# Patient Record
Sex: FEMALE | Race: WHITE
[De-identification: names, ages, dates, MRNs, and addresses within clinical notes are randomized per-mention and may not be internally consistent; named-entity substitution may affect disease eponyms.]

---

## 2018-05-17 ENCOUNTER — HOSPITAL ENCOUNTER (INPATIENT)
Dept: HOSPITAL 17 - NEPC | Age: 83
LOS: 9 days | Discharge: NURSING, REHAB OR LONG-TERM CARE | DRG: 225 | End: 2018-05-26
Attending: HOSPITALIST | Admitting: HOSPITALIST
Payer: COMMERCIAL

## 2018-05-17 VITALS
SYSTOLIC BLOOD PRESSURE: 101 MMHG | DIASTOLIC BLOOD PRESSURE: 79 MMHG | RESPIRATION RATE: 18 BRPM | HEART RATE: 106 BPM | OXYGEN SATURATION: 100 %

## 2018-05-17 VITALS
SYSTOLIC BLOOD PRESSURE: 108 MMHG | OXYGEN SATURATION: 100 % | DIASTOLIC BLOOD PRESSURE: 72 MMHG | HEART RATE: 160 BPM | RESPIRATION RATE: 24 BRPM

## 2018-05-17 VITALS
HEART RATE: 93 BPM | TEMPERATURE: 98 F | DIASTOLIC BLOOD PRESSURE: 87 MMHG | RESPIRATION RATE: 20 BRPM | SYSTOLIC BLOOD PRESSURE: 127 MMHG | OXYGEN SATURATION: 98 %

## 2018-05-17 VITALS
RESPIRATION RATE: 17 BRPM | DIASTOLIC BLOOD PRESSURE: 67 MMHG | HEART RATE: 68 BPM | SYSTOLIC BLOOD PRESSURE: 110 MMHG | OXYGEN SATURATION: 100 %

## 2018-05-17 VITALS — BODY MASS INDEX: 25.43 KG/M2 | WEIGHT: 162.04 LBS | HEIGHT: 67 IN

## 2018-05-17 VITALS
DIASTOLIC BLOOD PRESSURE: 77 MMHG | HEART RATE: 92 BPM | SYSTOLIC BLOOD PRESSURE: 131 MMHG | OXYGEN SATURATION: 98 % | RESPIRATION RATE: 20 BRPM

## 2018-05-17 VITALS
HEART RATE: 160 BPM | TEMPERATURE: 98.4 F | RESPIRATION RATE: 17 BRPM | DIASTOLIC BLOOD PRESSURE: 69 MMHG | OXYGEN SATURATION: 100 % | SYSTOLIC BLOOD PRESSURE: 110 MMHG

## 2018-05-17 VITALS
RESPIRATION RATE: 25 BRPM | HEART RATE: 63 BPM | OXYGEN SATURATION: 94 % | DIASTOLIC BLOOD PRESSURE: 69 MMHG | SYSTOLIC BLOOD PRESSURE: 122 MMHG | TEMPERATURE: 97.9 F

## 2018-05-17 VITALS
DIASTOLIC BLOOD PRESSURE: 60 MMHG | HEART RATE: 154 BPM | RESPIRATION RATE: 18 BRPM | SYSTOLIC BLOOD PRESSURE: 119 MMHG | OXYGEN SATURATION: 97 %

## 2018-05-17 VITALS — HEART RATE: 63 BPM

## 2018-05-17 DIAGNOSIS — I34.0: ICD-10-CM

## 2018-05-17 DIAGNOSIS — I10: ICD-10-CM

## 2018-05-17 DIAGNOSIS — E78.5: ICD-10-CM

## 2018-05-17 DIAGNOSIS — I25.2: ICD-10-CM

## 2018-05-17 DIAGNOSIS — R74.8: ICD-10-CM

## 2018-05-17 DIAGNOSIS — I25.5: ICD-10-CM

## 2018-05-17 DIAGNOSIS — Z87.891: ICD-10-CM

## 2018-05-17 DIAGNOSIS — M32.9: ICD-10-CM

## 2018-05-17 DIAGNOSIS — Z95.1: ICD-10-CM

## 2018-05-17 DIAGNOSIS — F41.9: ICD-10-CM

## 2018-05-17 DIAGNOSIS — I47.2: Primary | ICD-10-CM

## 2018-05-17 DIAGNOSIS — R44.3: ICD-10-CM

## 2018-05-17 DIAGNOSIS — I45.2: ICD-10-CM

## 2018-05-17 DIAGNOSIS — Z79.82: ICD-10-CM

## 2018-05-17 DIAGNOSIS — J95.811: ICD-10-CM

## 2018-05-17 DIAGNOSIS — E87.6: ICD-10-CM

## 2018-05-17 DIAGNOSIS — I25.10: ICD-10-CM

## 2018-05-17 LAB
ALBUMIN SERPL-MCNC: 3.7 GM/DL (ref 3.4–5)
ALP SERPL-CCNC: 67 U/L (ref 45–117)
ALT SERPL-CCNC: 25 U/L (ref 10–53)
AST SERPL-CCNC: 35 U/L (ref 15–37)
BASOPHILS # BLD AUTO: 0 TH/MM3 (ref 0–0.2)
BASOPHILS NFR BLD: 0.3 % (ref 0–2)
BILIRUB SERPL-MCNC: 0.9 MG/DL (ref 0.2–1)
BUN SERPL-MCNC: 12 MG/DL (ref 7–18)
CALCIUM SERPL-MCNC: 8.6 MG/DL (ref 8.5–10.1)
CHLORIDE SERPL-SCNC: 107 MEQ/L (ref 98–107)
CREAT SERPL-MCNC: 0.97 MG/DL (ref 0.5–1)
EOSINOPHIL # BLD: 0.1 TH/MM3 (ref 0–0.4)
EOSINOPHIL NFR BLD: 1.3 % (ref 0–4)
ERYTHROCYTE [DISTWIDTH] IN BLOOD BY AUTOMATED COUNT: 13.6 % (ref 11.6–17.2)
GFR SERPLBLD BASED ON 1.73 SQ M-ARVRAT: 55 ML/MIN (ref 89–?)
GLUCOSE SERPL-MCNC: 112 MG/DL (ref 74–106)
HCO3 BLD-SCNC: 23.1 MEQ/L (ref 21–32)
HCT VFR BLD CALC: 39.3 % (ref 35–46)
HGB BLD-MCNC: 13.1 GM/DL (ref 11.6–15.3)
INR PPP: 1 RATIO
LYMPHOCYTES # BLD AUTO: 1 TH/MM3 (ref 1–4.8)
LYMPHOCYTES NFR BLD AUTO: 9.7 % (ref 9–44)
MCH RBC QN AUTO: 32.6 PG (ref 27–34)
MCHC RBC AUTO-ENTMCNC: 33.3 % (ref 32–36)
MCV RBC AUTO: 98 FL (ref 80–100)
MONOCYTE #: 0.6 TH/MM3 (ref 0–0.9)
MONOCYTES NFR BLD: 5.8 % (ref 0–8)
NEUTROPHILS # BLD AUTO: 8.4 TH/MM3 (ref 1.8–7.7)
NEUTROPHILS NFR BLD AUTO: 82.9 % (ref 16–70)
PLATELET # BLD: 147 TH/MM3 (ref 150–450)
PMV BLD AUTO: 10 FL (ref 7–11)
PROT SERPL-MCNC: 6.4 GM/DL (ref 6.4–8.2)
PROTHROMBIN TIME: 10.2 SEC (ref 9.8–11.6)
RBC # BLD AUTO: 4.01 MIL/MM3 (ref 4–5.3)
SODIUM SERPL-SCNC: 143 MEQ/L (ref 136–145)
TROPONIN I SERPL-MCNC: 1.84 NG/ML (ref 0.02–0.05)
WBC # BLD AUTO: 10.2 TH/MM3 (ref 4–11)

## 2018-05-17 PROCEDURE — 85027 COMPLETE CBC AUTOMATED: CPT

## 2018-05-17 PROCEDURE — 99153 MOD SED SAME PHYS/QHP EA: CPT

## 2018-05-17 PROCEDURE — 94664 DEMO&/EVAL PT USE INHALER: CPT

## 2018-05-17 PROCEDURE — 85610 PROTHROMBIN TIME: CPT

## 2018-05-17 PROCEDURE — 71045 X-RAY EXAM CHEST 1 VIEW: CPT

## 2018-05-17 PROCEDURE — 96375 TX/PRO/DX INJ NEW DRUG ADDON: CPT

## 2018-05-17 PROCEDURE — C1777 LEAD, AICD, ENDO SINGLE COIL: HCPCS

## 2018-05-17 PROCEDURE — 83735 ASSAY OF MAGNESIUM: CPT

## 2018-05-17 PROCEDURE — 99152 MOD SED SAME PHYS/QHP 5/>YRS: CPT

## 2018-05-17 PROCEDURE — 94640 AIRWAY INHALATION TREATMENT: CPT

## 2018-05-17 PROCEDURE — 82948 REAGENT STRIP/BLOOD GLUCOSE: CPT

## 2018-05-17 PROCEDURE — 93005 ELECTROCARDIOGRAM TRACING: CPT

## 2018-05-17 PROCEDURE — 85730 THROMBOPLASTIN TIME PARTIAL: CPT

## 2018-05-17 PROCEDURE — 93459 L HRT ART/GRFT ANGIO: CPT

## 2018-05-17 PROCEDURE — C1893 INTRO/SHEATH, FIXED,NON-PEEL: HCPCS

## 2018-05-17 PROCEDURE — 84100 ASSAY OF PHOSPHORUS: CPT

## 2018-05-17 PROCEDURE — 84484 ASSAY OF TROPONIN QUANT: CPT

## 2018-05-17 PROCEDURE — 83880 ASSAY OF NATRIURETIC PEPTIDE: CPT

## 2018-05-17 PROCEDURE — C1769 GUIDE WIRE: HCPCS

## 2018-05-17 PROCEDURE — 85025 COMPLETE CBC W/AUTO DIFF WBC: CPT

## 2018-05-17 PROCEDURE — 96365 THER/PROPH/DIAG IV INF INIT: CPT

## 2018-05-17 PROCEDURE — C1722 AICD, SINGLE CHAMBER: HCPCS

## 2018-05-17 PROCEDURE — 93306 TTE W/DOPPLER COMPLETE: CPT

## 2018-05-17 PROCEDURE — 76937 US GUIDE VASCULAR ACCESS: CPT

## 2018-05-17 PROCEDURE — 87641 MR-STAPH DNA AMP PROBE: CPT

## 2018-05-17 PROCEDURE — 33249 INSJ/RPLCMT DEFIB W/LEAD(S): CPT

## 2018-05-17 PROCEDURE — 80053 COMPREHEN METABOLIC PANEL: CPT

## 2018-05-17 PROCEDURE — 80048 BASIC METABOLIC PNL TOTAL CA: CPT

## 2018-05-17 RX ADMIN — AMIODARONE HYDROCHLORIDE SCH MLS/HR: 50 INJECTION, SOLUTION INTRAVENOUS at 14:27

## 2018-05-17 RX ADMIN — HUMAN INSULIN SCH: 100 INJECTION, SOLUTION SUBCUTANEOUS at 16:00

## 2018-05-17 RX ADMIN — IPRATROPIUM BROMIDE AND ALBUTEROL SULFATE SCH AMPULE: .5; 3 SOLUTION RESPIRATORY (INHALATION) at 16:44

## 2018-05-17 RX ADMIN — FAMOTIDINE SCH MG: 20 TABLET, FILM COATED ORAL at 23:05

## 2018-05-17 RX ADMIN — TEMAZEPAM PRN MG: 7.5 CAPSULE ORAL at 23:05

## 2018-05-17 RX ADMIN — HUMAN INSULIN SCH: 100 INJECTION, SOLUTION SUBCUTANEOUS at 22:00

## 2018-05-17 RX ADMIN — IPRATROPIUM BROMIDE AND ALBUTEROL SULFATE SCH AMPULE: .5; 3 SOLUTION RESPIRATORY (INHALATION) at 21:59

## 2018-05-17 RX ADMIN — PHENYTOIN SODIUM SCH MLS/HR: 50 INJECTION INTRAMUSCULAR; INTRAVENOUS at 16:14

## 2018-05-17 NOTE — MH
cc:

Edu Hart MD, Alaa MD

****

 

 

DATE OF ADMISSION:

05/17/2018

 

HISTORY OF PRESENT ILLNESS:

The patient is an 84-year-old female with past medical history of 

coronary artery disease, previous MI in 1986, status post CABG in 

2001, hypertension, hyperlipidemia and lupus who presented to [facility] ED from Dr. Stokes's office after she was found to 

be in a wide complex tachycardia during an echocardiogram.  The 

patient denies any palpitations, lightheadedness, shortness of breath,

chest pain, syncope; however, she reports chronic edema of the left 

lower extremity.  EKG showed wide complex tachycardia and the patient 

was given adenosine 6 mg and 12 mg bolus without any effect on her 

heart rate.  She was subsequently given amiodarone bolus which was 

repeated and placed on amiodarone drip.  The patient was seen by Dr. Dickerson from Cardiology service.  Her laboratory data showed elevated 

troponin at 1.84.  A chest x-ray in the ED showed compensated 

cardiomegaly, atelectatic changes left hemidiaphragm and laterally in 

the left perihilar distribution.  The patient denies any fever, chills

or any constitutional symptoms.  She denies any similar presentation 

in the past.

 

PAST MEDICAL HISTORY:

Significant for coronary artery disease, previous MI in 1986, 

diverticulitis 2012, hypertension, hyperlipidemia, lupus.

 

PAST SURGICAL HISTORY:

Previous CABG.

 

ALLERGIES:

NO KNOWN DRUG ALLERGIES.

 

SOCIAL HISTORY:

Ex-smoker, quit smoking over 30 years ago.

 

MEDICATIONS AT HOME:

1.  Aspirin.

2.  Metoprolol.

3.  Lovastatin.

 

FAMILY HISTORY:

Noncontributing to present illness.

 

REVIEW OF SYSTEMS:

As per HPI.  The rest of review of systems is unremarkable.

 

PHYSICAL EXAMINATION:

GENERAL:  An 84-year-old female lying in bed in no acute respiratory 

distress.

VITAL SIGNS:  Afebrile with temperature 98.4, pulse of 97, heart rate 

was 150s to 160s on arrival.  Blood pressure 110/67, saturation 

% on room air.

HEENT:  Atraumatic, normocephalic.  Pupils are equal, round, reactive 

to light and accommodation.  Extraocular muscles intact.  Conjunctivae

pink. Nonicteric sclerae.  Oral mucosa within normal.

NECK:  Supple.  No JVD, adenopathy or thyromegaly.  Trachea in the 

midline.

CARDIOVASCULAR:  Regular rate and rhythm.  Normal S1, S2, no murmurs, 

rubs or gallops noted.

PULMONARY:  Bilateral equal entry.  No rales or wheezing.

ABDOMEN:  Soft, nontender.  No distention.  Positive bowel sounds.

EXTREMITIES:  No cyanosis, clubbing, +1 pretibial edema on the left.

NEUROLOGIC:  No focal sensory deficit.

 

CARDIOLOGY STUDIES:

EKG - wide complex tachycardia with nonspecific ST abnormalities.

 

LABORATORY DATA:

Sodium 143, potassium 4.1, chloride 107, CO2 of 23, BUN 12, creatinine

0.97, glucose 112, troponin 1.84, WBC 10.2, hemoglobin 13, hematocrit 

39, platelet count 147.

 

IMAGING STUDIES:

Chest x-ray showed cardiomegaly, atelectasis left hemidiaphragm.

 

IMPRESSION:

1.  Asymptomatic wide complex tachycardia.

2.  Elevated troponin which could be related to tachyarrhythmia; 

however, cannot exclude acute coronary syndrome.

3.  History of coronary artery disease and coronary artery bypass 

graft.

4.  Hypertension.

5.  Hyperlipidemia.

6.  History of lupus.

 

RECOMMENDATIONS:

1.  Monitor neuro status and avoid any sedatives.

2.  Oxygen p.r.n. to maintain sats above 92%.

3.  Bronchodilators on a p.r.n. basis.

4.  Monitor heart rate and blood pressure closely and maintain MAP 

greater than 65 mmHg.

5.  Continue aspirin 325 mg daily and continue with amiodarone drip.

6.  Monitor troponins and the patient will be started on a heparin 

drip per cardiology's recommendations for possible cardiac 

catheterization tomorrow per Dr. Dickerson to reassess patency of her 

bypass grafts and her left ventricular function.

7.  Monitor renal function, I's and O's and electrolyte replacement 

per protocol.

8.  Place on Pepcid for gastrointestinal prophylaxis.

9.  Monitor for signs of infection which include fever and WBC. 

Panculture if spikes a fever.

10.  Place on IV fluids normal saline at 75 mL an hour.

11.  Monitor CBC and coags as patient will be started on a heparin 

drip.

12.  Gastrointestinal prophylaxis with Pepcid and deep vein thrombosis

with sequential compression stockings and heparin drip.

 

Further recommendations will be based on hospital course.

 

 

__________________________________

MD ALEXANDRU Brooks/

D: 05/17/2018, 05:20 PM

T: 05/17/2018, 06:18 PM

Visit #: [id number]

Job #: [id number]

## 2018-05-17 NOTE — RADRPT
EXAM DATE/TIME:  05/17/2018 15:53 

 

[facility] COMPARISON:     

No previous studies available for comparison.

 

                     

INDICATIONS :     

Short of breath

                     

 

MEDICAL HISTORY :     

Myocardial infarction.  Cardiovascular disease.  Lupus.   Aneurysm   

 

SURGICAL HISTORY :     

CABG.   

 

ENCOUNTER:     

Initial                                        

 

ACUITY:     

1 day      

 

PAIN SCORE:     

0/10

 

LOCATION:     

Bilateral chest 

 

FINDINGS:     

A single view of the chest demonstrates a prominent heart without overt evidence of failure. There is
 some atelectatic changes above the left hemidiaphragm and laterally in the left perihilar distributi
on. Median sternotomy wires are intact. Dextroscoliosis of the dorsal spine with associated degenerat
tina spurring.

 

CONCLUSION:     

1. Compensated cardiomegaly.

2. Atelectatic changes above the left hemidiaphragm and laterally in the left perihilar distribution.


 

 

 

 Rashaun Esteban MD on May 17, 2018 at 16:06           

Board Certified Radiologist.

 This report was verified electronically.

## 2018-05-17 NOTE — PD
HPI


Chief Complaint:  Cardiac Complaint


Time Seen by Provider:  13:06


Travel History


International Travel<30 days:  No


Contact w/Intl Traveler<30days:  No


Traveled to known affect area:  No





History of Present Illness


HPI


This is an 84-year-old female who presents to the emergency department with a 

rapid heart rate.  She was getting a routine echo in her cardiology office when 

she was noted to have a heart rate of 150.  An EKG was obtained demonstrating a 

wide complex tachycardia, constant, severe with no associated symptoms.  

Patient denies any shortness of breath, chest pain or palpitations and she says 

she feels normal.  To her knowledge this is never happened before.  She does 

have a history of coronary artery disease and had a CABG years ago.





[facility]


Social History


Alcohol Use:  No


Tobacco Use:  No


Substance Use:  No





Allergies-Medications


(Allergen,Severity, Reaction):  


Coded Allergies:  


     No Known Drug Allergies (Verified  Allergy, Unknown, 5/17/18)





Review of Systems


Except as stated in HPI:  all other systems reviewed are Neg





Physical Exam


Narrative


GENERAL:Well appearing, no acute distress


SKIN: Focused skin assessment warm and dry.


HEAD: Atraumatic. Normocephalic. 


EYES: Pupils equal and round.  No injection or drainage. 


ENT:  Moist mucous membranes


NECK: Trachea midline. 


CARDIOVASCULAR: Tachycardic.  No murmur appreciated.


RESPIRATORY: Clear to auscultation. Breath sounds equal bilaterally. 


GASTROINTESTINAL: Abdomen soft, non-tender, nondistended. 


MUSCULOSKELETAL: No obvious deformities. 


NEUROLOGICAL: Awake and alert. No obvious cranial nerve deficits.  Moving all 

extremities.


PSYCHIATRIC: Appropriate mood and affect; insight and judgment normal.





Data


Data


Last Documented VS





Vital Signs








  Date Time  Temp Pulse Resp B/P (MAP) Pulse Ox O2 Delivery O2 Flow Rate FiO2


 


5/17/18 15:04  154 18 119/60 (79) 97 Room Air  


 


5/17/18 12:55 98.4       








Orders





 Orders


Adenosine Inj (Adenocard Inj) (5/17/18 13:00)


Adenosine Inj (Adenocard Inj) (5/17/18 13:16)


Adenosine Inj (Adenocard Inj) (5/17/18 13:19)


Complete Blood Count With Diff (5/17/18 13:12)


Comprehensive Metabolic Panel (5/17/18 13:12)


Troponin I (5/17/18 13:12)


^ Insert Iv (5/17/18 13:12)


Adenosine Inj (Adenocard Inj) (5/17/18 13:15)


Ecg Monitoring (5/17/18 13:29)


Blood Pressure (5/17/18 13:29)


Vital Signs (5/17/18 13:29)


Iv Access Insert/Monitor (5/17/18 13:29)


Oximetry (5/17/18 13:29)


Dextrose 5% In [place]... W/Amiodarone Inj (5/17/18 13:29)


Dextrose 5% In [place]... W/Amiodarone Inj (5/17/18 13:44)


Sodium Chloride 0.9% Flush (Ns Flush) (5/17/18 13:30)


Sodium Chlor 0.9% (... W/Amiodarone Inj (5/17/18 13:44)


Electrocardiogram (5/17/18 12:57)


Electrocardiogram (5/17/18 13:18)


Electrocardiogram (5/17/18 13:23)


Aspirin Ec (Ecotrin Ec) (5/18/18 09:00)


Cardiac Catheterization (5/18/18 11:00)


Consent (5/17/18 14:49)


^ Preps (5/17/18 14:49)


Diet Npo Except Meds (5/18/18 Breakfast)


Sodium Chlor 0.9% 1000 Ml Inj (Ns 1000 M (5/17/18 14:49)


Diphenhydramine (Benadryl) (5/17/18 15:00)


Diazepam (Valium) (5/17/18 15:00)


Midazolam Inj (Versed Inj) (5/17/18 15:00)


Dextrose 5% In [place]... W/Amiodarone Inj (5/17/18 14:49)


Admit Order (Ed Use Only) (5/17/18 15:40)





Labs





Laboratory Tests








Test


  5/17/18


13:20


 


White Blood Count 10.2 TH/MM3 


 


Red Blood Count 4.01 MIL/MM3 


 


Hemoglobin 13.1 GM/DL 


 


Hematocrit 39.3 % 


 


Mean Corpuscular Volume 98.0 FL 


 


Mean Corpuscular Hemoglobin 32.6 PG 


 


Mean Corpuscular Hemoglobin


Concent 33.3 % 


 


 


Red Cell Distribution Width 13.6 % 


 


Platelet Count 147 TH/MM3 


 


Mean Platelet Volume 10.0 FL 


 


Neutrophils (%) (Auto) 82.9 % 


 


Lymphocytes (%) (Auto) 9.7 % 


 


Monocytes (%) (Auto) 5.8 % 


 


Eosinophils (%) (Auto) 1.3 % 


 


Basophils (%) (Auto) 0.3 % 


 


Neutrophils # (Auto) 8.4 TH/MM3 


 


Lymphocytes # (Auto) 1.0 TH/MM3 


 


Monocytes # (Auto) 0.6 TH/MM3 


 


Eosinophils # (Auto) 0.1 TH/MM3 


 


Basophils # (Auto) 0.0 TH/MM3 


 


CBC Comment DIFF FINAL 


 


Differential Comment  


 


Blood Urea Nitrogen 12 MG/DL 


 


Creatinine 0.97 MG/DL 


 


Random Glucose 112 MG/DL 


 


Total Protein 6.4 GM/DL 


 


Albumin 3.7 GM/DL 


 


Calcium Level 8.6 MG/DL 


 


Alkaline Phosphatase 67 U/L 


 


Aspartate Amino Transf


(AST/SGOT) 35 U/L 


 


 


Alanine Aminotransferase


(ALT/SGPT) 25 U/L 


 


 


Total Bilirubin 0.9 MG/DL 


 


Sodium Level 143 MEQ/L 


 


Potassium Level 4.1 MEQ/L 


 


Chloride Level 107 MEQ/L 


 


Carbon Dioxide Level 23.1 MEQ/L 


 


Anion Gap 13 MEQ/L 


 


Estimat Glomerular Filtration


Rate 55 ML/MIN 


 


 


Troponin I 1.84 NG/ML 











[facility]


Medical Decision Making


Medical Screen Exam Complete:  Yes


Emergency Medical Condition:  Yes


Interpretation(s)


EKG: Wide complex tachycardia with some fusion beats


No leukocytosis


Electrolytes are reassuring


Troponin is 1.8


Differential Diagnosis


SVT with aberrancy, ventricular tachycardia, nSTEMI


Narrative Course


This is an 84-year-old female who presents to the emergency department and wide-

complex tachycardia.  She is asymptomatic.  She has a normal blood pressure and 

has no chest pain or difficulty breathing.  She was placed on a monitor and an 

IV was established.  I discussed the patient with Dr. Stokes her 

cardiologist.  He agreed to a plan of adenosine and subsequent amiodarone.  

Patient was given 2 boluses of 6 mg of IV adenosine and then a 12 mg IV bolus.  

Patient is heart rate did not respond.  I suspect this is ventricular 

tachycardia.  Patient was started on amiodarone.  She was seen by Dr. Ellis's 

who is on-call for cardiology.  He recommended an additional dose of 

amiodarone.  Patient subsequently converted and now is in normal sinus rhythm.  

She will be admitted to the intensive care unit for close monitoring.  Dr. Dickerson plans to take the patient to either later today or early tomorrow.


Critical Care Narrative


Aggregate critical care time was 70 minutes. Time to perform other separately 

billable procedures was not included in the critical care time. My time did not 

include minutes spent treating any other patients simultaneously or on 

activities that did not directly contribute to the patient's treatment.  





The services I provided to this patient were to treat and/or prevent clinically 

significant deterioration that could result in: Disability, death





I provided critical care services requiring my management, as noted below:


Chart data review, documentation time, medication orders and management, vital 

sign assessments/reviewing monitor data, ordering and reviewing lab tests, 

ordering and interpreting/reviewing x-rays and diagnostic studies, care of the 

patient and discussion of the patient with the admitting physicians.





Physician Communication


Physician Communication


Discussed with Dr. Stokes, Dr. Dickerson, and Dr. Sethi





Diagnosis





 Primary Impression:  


 Wide-complex tachycardia





Admitting Information


Admitting Physician Requests:  Admit











Lissett Ott MD May 17, 2018 16:31

## 2018-05-17 NOTE — MB
cc:

Edin Dickerson MD

****

 

 

DATE:

2018

 

REASON FOR CONSULTATION:

Wide complex tachycardia.

 

HISTORY OF PRESENT ILLNESS:

The patient is an 84-year-old white female, followed in our office by 

Dr. Kenrick Stokes, with a history of coronary artery disease, 

hyperlipidemia, lupus, hypertension, who was sent to the hospital 

today after she was found to be in a wide complex tachycardia during 

an echocardiogram in our office.  Apart from fatigue, the patient 

states she has felt "just fine." She denies palpitations, 

lightheadedness, syncope, near syncope, chest pain, shortness of 

breath, change in chronic left pedal edema, paroxysmal nocturnal 

dyspnea, orthopnea, fevers or flu symptoms.

 

PAST MEDICAL HISTORY:

1.  Coronary artery disease, status post myocardial infarction in 

, status post bypass surgery in  with a left internal mammary 

artery to the LAD and 3 separate vein grafts to the diagonal, first 

obtuse marginal and right coronary artery.

2.  Diverticulitis .

3.  Hyperlipidemia.

4.  Hypertension.

5.  Systemic lupus erythematosus.

 

CARDIAC MEDICATIONS AT HOME:

1.  Aspirin 325 mg daily.

2.  Metoprolol succinate 50 mg daily.

3.  Lovastatin 40 mg at bedtime.

 

ALLERGIES:

CRESTOR.

LESCOL.

 

FAMILY HISTORY:

Noncontributory.

 

SOCIAL HISTORY:

The patient is a former smoker.  There is no history of alcohol abuse.

 

REVIEW OF SYSTEMS:

As in the History Of Present Illness, otherwise negative or 

noncontributory.  She also denies abdominal pain, melena, dyspepsia, 

bright red blood per rectum, cough, wheezing.  Since her daughter 

about 2 months ago, she has had daily headaches.

 

PHYSICAL EXAMINATION:

VITAL SIGNS:  Her blood pressure is 93/63 with a pulse of 153, 

respirations 20.

GENERAL:  She is a well-developed, well-nourished white female, in no 

acute distress.

HEENT/NECK:  Jugular venous pressure is normal.  Carotid pulses are 2+

bilaterally and without bruits.

LUNGS:  Examination of the chest reveals clear lung fields.

HEART:  She has a tachycardic, regular rhythm without S3, S4 or 

murmur.

ABDOMEN:  On abdominal examination, she has a soft, nontender abdomen.

 Bowel sounds are present.  There is no definite hepatosplenomegaly.

EXTREMITIES:  Reveals no clubbing or cyanosis.  There is 1+ left 

pretibial edema.

 

EKG shows wide complex tachycardia with right bundle branch block 

morphology, left anterior fascicular block, nonspecific ST 

abnormalities.

 

LABORATORY DATA:

Includes normal CBC.

Potassium 4.1, BUN 12, creatinine 0.97.

Troponin 1.84.

 

IMPRESSION:

Asymptomatic wide complex tachycardia in this 84-year-old white female

with a history of coronary artery disease status post bypass surgery 

in , history of lupus, hypertension, hyperlipidemia.  At this 

time, she remains normotensive.  She has no significant cardiovascular

symptoms.  Troponin level is mildly abnormal, though it could be due 

to her tachyarrhythmia.  There is no definite evidence for congestive 

heart failure.  It is difficult to tell whether the rhythm is 

aberrantly conducted supraventricular tachycardia.  Reportedly, her 

echocardiogram today, which was technically difficult due to the 

tachyarrhythmia, shows ejection fraction of 30-35%.

 

RECOMMENDATIONS:

1.  Agree with intravenous amiodarone.  We will administer an 

additional bolus at this time.

2.  If she does not convert to sinus rhythm in the next hour, 

recommend cardioversion.

3.  Cardiac catheterization tomorrow to most definitively reassess the

patency of her bypass grafts and her left ventricular function.

4.  Hold off on her beta blocker at this time given her relatively low

blood pressure.

5.  Continue daily aspirin.

 

 

__________________________________

MD PERLITA Adkins/ANNETTE

D: 2018, 02:48 PM

T: 2018, 03:14 PM

Visit #: [id number]

Job #: [id number]

BRI

## 2018-05-18 VITALS
OXYGEN SATURATION: 90 % | SYSTOLIC BLOOD PRESSURE: 139 MMHG | DIASTOLIC BLOOD PRESSURE: 75 MMHG | TEMPERATURE: 99 F | RESPIRATION RATE: 30 BRPM | HEART RATE: 74 BPM

## 2018-05-18 VITALS
SYSTOLIC BLOOD PRESSURE: 165 MMHG | HEART RATE: 111 BPM | RESPIRATION RATE: 38 BRPM | OXYGEN SATURATION: 89 % | TEMPERATURE: 98.1 F | DIASTOLIC BLOOD PRESSURE: 89 MMHG

## 2018-05-18 VITALS
RESPIRATION RATE: 26 BRPM | SYSTOLIC BLOOD PRESSURE: 137 MMHG | HEART RATE: 97 BPM | OXYGEN SATURATION: 92 % | DIASTOLIC BLOOD PRESSURE: 80 MMHG

## 2018-05-18 VITALS
DIASTOLIC BLOOD PRESSURE: 78 MMHG | SYSTOLIC BLOOD PRESSURE: 159 MMHG | HEART RATE: 66 BPM | OXYGEN SATURATION: 93 % | RESPIRATION RATE: 19 BRPM

## 2018-05-18 VITALS
DIASTOLIC BLOOD PRESSURE: 65 MMHG | OXYGEN SATURATION: 98 % | RESPIRATION RATE: 21 BRPM | HEART RATE: 65 BPM | SYSTOLIC BLOOD PRESSURE: 144 MMHG

## 2018-05-18 VITALS
DIASTOLIC BLOOD PRESSURE: 77 MMHG | OXYGEN SATURATION: 92 % | HEART RATE: 103 BPM | SYSTOLIC BLOOD PRESSURE: 154 MMHG | RESPIRATION RATE: 28 BRPM

## 2018-05-18 VITALS
RESPIRATION RATE: 20 BRPM | HEART RATE: 97 BPM | SYSTOLIC BLOOD PRESSURE: 155 MMHG | DIASTOLIC BLOOD PRESSURE: 80 MMHG | TEMPERATURE: 98 F | OXYGEN SATURATION: 98 %

## 2018-05-18 VITALS
RESPIRATION RATE: 25 BRPM | HEART RATE: 73 BPM | SYSTOLIC BLOOD PRESSURE: 151 MMHG | DIASTOLIC BLOOD PRESSURE: 72 MMHG | OXYGEN SATURATION: 83 %

## 2018-05-18 VITALS
TEMPERATURE: 97.5 F | DIASTOLIC BLOOD PRESSURE: 82 MMHG | RESPIRATION RATE: 19 BRPM | HEART RATE: 58 BPM | SYSTOLIC BLOOD PRESSURE: 134 MMHG | OXYGEN SATURATION: 98 %

## 2018-05-18 VITALS — HEART RATE: 69 BPM

## 2018-05-18 VITALS
RESPIRATION RATE: 23 BRPM | TEMPERATURE: 98 F | SYSTOLIC BLOOD PRESSURE: 156 MMHG | HEART RATE: 67 BPM | OXYGEN SATURATION: 97 % | DIASTOLIC BLOOD PRESSURE: 75 MMHG

## 2018-05-18 VITALS
RESPIRATION RATE: 19 BRPM | DIASTOLIC BLOOD PRESSURE: 80 MMHG | OXYGEN SATURATION: 94 % | HEART RATE: 96 BPM | SYSTOLIC BLOOD PRESSURE: 138 MMHG | TEMPERATURE: 98 F

## 2018-05-18 VITALS
RESPIRATION RATE: 23 BRPM | DIASTOLIC BLOOD PRESSURE: 72 MMHG | SYSTOLIC BLOOD PRESSURE: 155 MMHG | OXYGEN SATURATION: 97 % | HEART RATE: 66 BPM

## 2018-05-18 VITALS
DIASTOLIC BLOOD PRESSURE: 65 MMHG | SYSTOLIC BLOOD PRESSURE: 134 MMHG | OXYGEN SATURATION: 89 % | HEART RATE: 60 BPM | RESPIRATION RATE: 20 BRPM

## 2018-05-18 VITALS
RESPIRATION RATE: 28 BRPM | SYSTOLIC BLOOD PRESSURE: 148 MMHG | OXYGEN SATURATION: 87 % | DIASTOLIC BLOOD PRESSURE: 94 MMHG | HEART RATE: 98 BPM

## 2018-05-18 VITALS — HEART RATE: 62 BPM

## 2018-05-18 VITALS
RESPIRATION RATE: 22 BRPM | DIASTOLIC BLOOD PRESSURE: 72 MMHG | OXYGEN SATURATION: 98 % | SYSTOLIC BLOOD PRESSURE: 88 MMHG | HEART RATE: 66 BPM

## 2018-05-18 VITALS
HEART RATE: 94 BPM | OXYGEN SATURATION: 95 % | SYSTOLIC BLOOD PRESSURE: 163 MMHG | DIASTOLIC BLOOD PRESSURE: 92 MMHG | RESPIRATION RATE: 20 BRPM

## 2018-05-18 VITALS
RESPIRATION RATE: 19 BRPM | OXYGEN SATURATION: 99 % | HEART RATE: 55 BPM | SYSTOLIC BLOOD PRESSURE: 134 MMHG | DIASTOLIC BLOOD PRESSURE: 74 MMHG | TEMPERATURE: 98.1 F

## 2018-05-18 VITALS
SYSTOLIC BLOOD PRESSURE: 149 MMHG | RESPIRATION RATE: 32 BRPM | DIASTOLIC BLOOD PRESSURE: 91 MMHG | HEART RATE: 104 BPM | OXYGEN SATURATION: 90 %

## 2018-05-18 VITALS — HEART RATE: 58 BPM

## 2018-05-18 VITALS
DIASTOLIC BLOOD PRESSURE: 67 MMHG | SYSTOLIC BLOOD PRESSURE: 140 MMHG | HEART RATE: 101 BPM | RESPIRATION RATE: 21 BRPM | OXYGEN SATURATION: 85 %

## 2018-05-18 VITALS — HEART RATE: 57 BPM

## 2018-05-18 LAB
ALBUMIN SERPL-MCNC: 3.1 GM/DL (ref 3.4–5)
ALP SERPL-CCNC: 59 U/L (ref 45–117)
ALT SERPL-CCNC: 25 U/L (ref 10–53)
AST SERPL-CCNC: 55 U/L (ref 15–37)
BASOPHILS # BLD AUTO: 0.1 TH/MM3 (ref 0–0.2)
BASOPHILS NFR BLD: 0.8 % (ref 0–2)
BILIRUB SERPL-MCNC: 0.8 MG/DL (ref 0.2–1)
BUN SERPL-MCNC: 11 MG/DL (ref 7–18)
CALCIUM SERPL-MCNC: 8.1 MG/DL (ref 8.5–10.1)
CHLORIDE SERPL-SCNC: 110 MEQ/L (ref 98–107)
CREAT SERPL-MCNC: 0.81 MG/DL (ref 0.5–1)
EOSINOPHIL # BLD: 0.3 TH/MM3 (ref 0–0.4)
EOSINOPHIL NFR BLD: 3.4 % (ref 0–4)
ERYTHROCYTE [DISTWIDTH] IN BLOOD BY AUTOMATED COUNT: 13.6 % (ref 11.6–17.2)
GFR SERPLBLD BASED ON 1.73 SQ M-ARVRAT: 67 ML/MIN (ref 89–?)
GLUCOSE SERPL-MCNC: 111 MG/DL (ref 74–106)
HCO3 BLD-SCNC: 24.5 MEQ/L (ref 21–32)
HCT VFR BLD CALC: 33.9 % (ref 35–46)
HGB BLD-MCNC: 11.7 GM/DL (ref 11.6–15.3)
LYMPHOCYTES # BLD AUTO: 1.4 TH/MM3 (ref 1–4.8)
LYMPHOCYTES NFR BLD AUTO: 16.3 % (ref 9–44)
MAGNESIUM SERPL-MCNC: 1.8 MG/DL (ref 1.5–2.5)
MCH RBC QN AUTO: 33.4 PG (ref 27–34)
MCHC RBC AUTO-ENTMCNC: 34.5 % (ref 32–36)
MCV RBC AUTO: 96.8 FL (ref 80–100)
MONOCYTE #: 0.6 TH/MM3 (ref 0–0.9)
MONOCYTES NFR BLD: 6.7 % (ref 0–8)
NEUTROPHILS # BLD AUTO: 6.3 TH/MM3 (ref 1.8–7.7)
NEUTROPHILS NFR BLD AUTO: 72.8 % (ref 16–70)
PHOSPHATE SERPL-MCNC: 3.6 MG/DL (ref 2.5–4.9)
PLATELET # BLD: 115 TH/MM3 (ref 150–450)
PMV BLD AUTO: 9.4 FL (ref 7–11)
PROT SERPL-MCNC: 5.6 GM/DL (ref 6.4–8.2)
RBC # BLD AUTO: 3.51 MIL/MM3 (ref 4–5.3)
SODIUM SERPL-SCNC: 142 MEQ/L (ref 136–145)
TROPONIN I SERPL-MCNC: 6.59 NG/ML (ref 0.02–0.05)
WBC # BLD AUTO: 8.6 TH/MM3 (ref 4–11)

## 2018-05-18 PROCEDURE — B2151ZZ FLUOROSCOPY OF LEFT HEART USING LOW OSMOLAR CONTRAST: ICD-10-PCS

## 2018-05-18 PROCEDURE — 4A023N7 MEASUREMENT OF CARDIAC SAMPLING AND PRESSURE, LEFT HEART, PERCUTANEOUS APPROACH: ICD-10-PCS

## 2018-05-18 PROCEDURE — B2121ZZ FLUOROSCOPY OF SINGLE CORONARY ARTERY BYPASS GRAFT USING LOW OSMOLAR CONTRAST: ICD-10-PCS

## 2018-05-18 PROCEDURE — B2111ZZ FLUOROSCOPY OF MULTIPLE CORONARY ARTERIES USING LOW OSMOLAR CONTRAST: ICD-10-PCS

## 2018-05-18 RX ADMIN — AMIODARONE HYDROCHLORIDE SCH MLS/HR: 50 INJECTION, SOLUTION INTRAVENOUS at 12:29

## 2018-05-18 RX ADMIN — TEMAZEPAM PRN MG: 7.5 CAPSULE ORAL at 21:09

## 2018-05-18 RX ADMIN — HUMAN INSULIN SCH: 100 INJECTION, SOLUTION SUBCUTANEOUS at 03:55

## 2018-05-18 RX ADMIN — HUMAN INSULIN SCH: 100 INJECTION, SOLUTION SUBCUTANEOUS at 10:00

## 2018-05-18 RX ADMIN — IPRATROPIUM BROMIDE AND ALBUTEROL SULFATE SCH AMPULE: .5; 3 SOLUTION RESPIRATORY (INHALATION) at 21:11

## 2018-05-18 RX ADMIN — IPRATROPIUM BROMIDE AND ALBUTEROL SULFATE SCH AMPULE: .5; 3 SOLUTION RESPIRATORY (INHALATION) at 04:54

## 2018-05-18 RX ADMIN — CHLORHEXIDINE GLUCONATE SCH PACK: 500 CLOTH TOPICAL at 04:00

## 2018-05-18 RX ADMIN — ENALAPRIL MALEATE SCH MG: 10 TABLET ORAL at 17:40

## 2018-05-18 RX ADMIN — HUMAN INSULIN SCH: 100 INJECTION, SOLUTION SUBCUTANEOUS at 21:09

## 2018-05-18 RX ADMIN — AMIODARONE HYDROCHLORIDE SCH MLS/HR: 50 INJECTION, SOLUTION INTRAVENOUS at 00:02

## 2018-05-18 RX ADMIN — FAMOTIDINE SCH MG: 20 TABLET, FILM COATED ORAL at 08:55

## 2018-05-18 RX ADMIN — IPRATROPIUM BROMIDE AND ALBUTEROL SULFATE SCH AMPULE: .5; 3 SOLUTION RESPIRATORY (INHALATION) at 15:19

## 2018-05-18 RX ADMIN — PHENYTOIN SODIUM SCH MLS/HR: 50 INJECTION INTRAMUSCULAR; INTRAVENOUS at 03:52

## 2018-05-18 RX ADMIN — FAMOTIDINE SCH MG: 20 TABLET, FILM COATED ORAL at 21:09

## 2018-05-18 RX ADMIN — METOPROLOL SUCCINATE SCH MG: 50 TABLET, FILM COATED, EXTENDED RELEASE ORAL at 17:39

## 2018-05-18 RX ADMIN — AMIODARONE HYDROCHLORIDE SCH MLS/HR: 50 INJECTION, SOLUTION INTRAVENOUS at 21:09

## 2018-05-18 RX ADMIN — HUMAN INSULIN SCH: 100 INJECTION, SOLUTION SUBCUTANEOUS at 16:00

## 2018-05-18 RX ADMIN — HYDROCODONE BITARTRATE AND ACETAMINOPHEN PRN TAB: 5; 325 TABLET ORAL at 17:50

## 2018-05-18 RX ADMIN — IPRATROPIUM BROMIDE AND ALBUTEROL SULFATE SCH AMPULE: .5; 3 SOLUTION RESPIRATORY (INHALATION) at 09:55

## 2018-05-18 RX ADMIN — HYDROCODONE BITARTRATE AND ACETAMINOPHEN PRN TAB: 5; 325 TABLET ORAL at 00:03

## 2018-05-18 RX ADMIN — IPRATROPIUM BROMIDE AND ALBUTEROL SULFATE SCH AMPULE: .5; 3 SOLUTION RESPIRATORY (INHALATION) at 04:00

## 2018-05-18 RX ADMIN — AMIODARONE HYDROCHLORIDE SCH MLS/HR: 50 INJECTION, SOLUTION INTRAVENOUS at 15:50

## 2018-05-18 RX ADMIN — HYDROCODONE BITARTRATE AND ACETAMINOPHEN PRN TAB: 5; 325 TABLET ORAL at 21:09

## 2018-05-18 RX ADMIN — ASPIRIN SCH MG: 325 TABLET, DELAYED RELEASE ORAL at 08:55

## 2018-05-18 RX ADMIN — PHENYTOIN SODIUM SCH MLS/HR: 50 INJECTION INTRAMUSCULAR; INTRAVENOUS at 17:53

## 2018-05-18 NOTE — PD.CARD.PN
Subjective


Subjective Remarks


No CP, dyspnea, dizziness, palpitations.





Objective


Medications











Item Value  Date Time


 


Aspirin 325 mg 5/18/18 0900





 (Ecotrin Ec) DAILY/PO 5/18/18 0855


 


Heparin Sodium/ 250 ml @ 9 mls/hr 5/17/18 1645





Dextrose TITRATE  PRN/IV 5/18/18 0735


 


Amiodarone HCl 250 ml @ 33 mls/hr 5/17/18 1344





450 mg/Sodium .Q7H35M/IV 5/18/18 0002





Chloride  











Current Medications








 Medications


  (Trade)  Dose


 Ordered  Sig/Ag


 Route  Start Time


 Stop Time Status Last Admin


 


  (NS Flush)  2 ml  UNSCH  PRN


 IVF  5/17/18 13:30


     


 


 


 Amiodarone HCl


 450 mg/Sodium


 Chloride  250 ml @ 


 33 mls/hr  Q7H35M


 IV  5/17/18 13:44


    5/18/18 00:02


 


 


  (Ecotrin Ec)  325 mg  DAILY


 PO  5/18/18 09:00


    5/18/18 08:55


 


 


  (Benadryl)  50 mg  ON  CALL


 PO  5/17/18 15:00


 5/21/18 14:59   


 


 


  (Valium)  10 mg  ON  CALL


 PO  5/17/18 15:00


 5/21/18 14:59   


 


 


  (Versed Inj)  1 mg  ON  CALL


 IV PUSH  5/17/18 15:00


 5/21/18 14:59   


 


 


  (Pepcid)  20 mg  Q12HR


 PO  5/17/18 21:00


    5/18/18 08:55


 


 


  (Duoneb Neb)  1 ampule  Q6HR  NEB


 INH  5/17/18 16:00


    5/18/18 09:55


 


 


  (Duoneb Neb)  1 ampule  Q2HR NEB  PRN


 INH  5/17/18 15:45


     


 


 


  (Misc Nursing


 Information)  1  [id number]


 XX  5/17/18 15:45


     


 


 


  (Chlorhexidine


 2% Cloth)  3 pack


 Taper  DAILY@04


 TOP  5/18/18 04:00


 5/14/19 03:59  5/18/18 04:00


 


 


  (Chlorhexidine


 2% Cloth)  3 pack  UNSCH  PRN


 TOP  5/17/18 15:45


     


 


 


 Sodium Chloride  1,000 ml @ 


 75 mls/hr  [id number]


 IV  5/17/18 15:45


    5/18/18 03:52


 


 


  (D50w (Vial) Inj)  50 ml  UNSCH  PRN


 IV PUSH  5/17/18 16:00


     


 


 


  (Glucagon Inj)  1 mg  UNSCH  PRN


 OTHER  5/17/18 16:00


     


 


 


  (NovoLIN R


 SUPPLEMENTAL


 SCALE)  1  Q6H


 SQ  5/17/18 16:00


     


 


 


 Heparin Sodium/


 Dextrose  250 ml @ 9


 mls/hr  TITRATE  PRN


 IV  5/17/18 16:45


    5/18/18 07:35


 


 


  (Restoril)  7.5 mg  HS  PRN


 PO  5/17/18 20:45


    5/17/18 23:05


 


 


  (Tylenol)  650 mg  Q6H  PRN


 PO  5/17/18 23:30


     


 


 


  (Norco  5-325 Mg)  1 tab  Q4H  PRN


 PO  5/17/18 23:30


    5/18/18 00:03


 


 


  (Morphine Inj)  2 mg  Q2H  PRN


 IV PUSH  5/17/18 23:30


     


 








Vital Signs / I&O





Vital Signs








  Date Time  Temp Pulse Resp B/P (MAP) Pulse Ox O2 Delivery O2 Flow Rate FiO2


 


5/18/18 10:00  62      


 


5/18/18 08:00 97.5 58 19 134/82 (99) 98   


 


5/18/18 08:00  58      


 


5/18/18 06:00  58      


 


5/18/18 04:00  55      


 


5/18/18 04:00 98.1 55 19 134/74 (94) 99   


 


5/18/18 02:00  57      


 


5/18/18 00:02  59  134/65    


 


5/18/18 00:00  60      


 


5/18/18 00:00  60 20 134/65 (88) 89   


 


5/17/18 22:00  63      


 


5/17/18 20:00 97.9 63 25 122/69 (86) 94   


 


5/17/18 20:00  63      


 


5/17/18 18:00  92 20 131/77 (95) 98   


 


5/17/18 18:00  92      


 


5/17/18 17:30  93      


 


5/17/18 17:30 98.0 93 20 127/87 (100) 98   


 


5/17/18 16:02  68 17 110/67 (81) 100   


 


5/17/18 15:44  157  119/60    


 


5/17/18 15:04  154 18 119/60 (79) 97 Room Air  


 


5/17/18 14:27  153  93/63    


 


5/17/18 14:14  160 24 108/72 (84) 100 Room Air  


 


5/17/18 14:11  161  101/61    


 


5/17/18 13:32  106 18 101/79 (86) 100 Room Air  


 


5/17/18 13:32     100 Room Air  


 


5/17/18 13:32  106 18 101/79 (86) 100 Room Air  


 


5/17/18 12:58  160 17  97 Room Air  


 


5/17/18 12:55 98.4 160 17 110/69 (83) 100   














I/O      


 


 5/17/18 5/17/18 5/17/18 5/18/18 5/18/18 5/18/18





 07:00 15:00 23:00 07:00 15:00 23:00


 


Intake Total    2334 ml  


 


Output Total    525 ml  


 


Balance    1809 ml  


 


      


 


Intake IV Total    2334 ml  


 


Output Urine Total    525 ml  


 


# Bowel Movements    0  








Physical Exam


GENERAL: Well developed, well nourished. No acute distress.


HEENT: Jugular venous pressure is normal. 


CHEST: Lungs clear to auscultation anteriorly.


CARDIAC: Regular rate and rhythm without S3, S4.  II/VI systolic murmur apex.


ABDOMEN: Soft, nontender, no hepatosplenomegaly. Bowel sounds present.


EXTREMITIES: No clubbing, cyanosis, or edema.


Laboratory





Laboratory Tests








Test


  5/17/18


13:20 5/17/18


13:30 5/17/18


17:30 5/17/18


20:04


 


White Blood Count 10.2 TH/MM3    


 


Red Blood Count 4.01 MIL/MM3    


 


Hemoglobin 13.1 GM/DL    


 


Hematocrit 39.3 %    


 


Mean Corpuscular Volume 98.0 FL    


 


Mean Corpuscular Hemoglobin 32.6 PG    


 


Mean Corpuscular Hemoglobin


Concent 33.3 % 


  


  


  


 


 


Red Cell Distribution Width 13.6 %    


 


Platelet Count 147 TH/MM3    


 


Mean Platelet Volume 10.0 FL    


 


Neutrophils (%) (Auto) 82.9 %    


 


Lymphocytes (%) (Auto) 9.7 %    


 


Monocytes (%) (Auto) 5.8 %    


 


Eosinophils (%) (Auto) 1.3 %    


 


Basophils (%) (Auto) 0.3 %    


 


Neutrophils # (Auto) 8.4 TH/MM3    


 


Lymphocytes # (Auto) 1.0 TH/MM3    


 


Monocytes # (Auto) 0.6 TH/MM3    


 


Eosinophils # (Auto) 0.1 TH/MM3    


 


Basophils # (Auto) 0.0 TH/MM3    


 


CBC Comment DIFF FINAL    


 


Differential Comment     


 


Blood Urea Nitrogen 12 MG/DL    


 


Creatinine 0.97 MG/DL    


 


Random Glucose 112 MG/DL    


 


Total Protein 6.4 GM/DL    


 


Albumin 3.7 GM/DL    


 


Calcium Level 8.6 MG/DL    


 


Alkaline Phosphatase 67 U/L    


 


Aspartate Amino Transf


(AST/SGOT) 35 U/L 


  


  


  


 


 


Alanine Aminotransferase


(ALT/SGPT) 25 U/L 


  


  


  


 


 


Total Bilirubin 0.9 MG/DL    


 


Sodium Level 143 MEQ/L    


 


Potassium Level 4.1 MEQ/L    


 


Chloride Level 107 MEQ/L    


 


Carbon Dioxide Level 23.1 MEQ/L    


 


Anion Gap 13 MEQ/L    


 


Estimat Glomerular Filtration


Rate 55 ML/MIN 


  


  


  


 


 


Troponin I 1.84 NG/ML    5.05 NG/ML 


 


Prothrombin Time  10.2 SEC   


 


Prothromb Time International


Ratio 


  1.0 RATIO 


  


  


 


 


Activated Partial


Thromboplast Time 


  24.9 SEC 


  


  


 


 


Nasal Screen MRSA (PCR)


  


  


  MRSA NOT


DETECTED 


 


 


Test


  5/17/18


23:35 5/18/18


01:56 5/18/18


06:52 5/18/18


08:41


 


Activated Partial


Thromboplast Time 43.6 SEC 


  


  51.6 SEC 


  


 


 


White Blood Count  8.6 TH/MM3   


 


Red Blood Count  3.51 MIL/MM3   


 


Hemoglobin  11.7 GM/DL   


 


Hematocrit  33.9 %   


 


Mean Corpuscular Volume  96.8 FL   


 


Mean Corpuscular Hemoglobin  33.4 PG   


 


Mean Corpuscular Hemoglobin


Concent 


  34.5 % 


  


  


 


 


Red Cell Distribution Width  13.6 %   


 


Platelet Count  115 TH/MM3   


 


Mean Platelet Volume  9.4 FL   


 


Neutrophils (%) (Auto)  72.8 %   


 


Lymphocytes (%) (Auto)  16.3 %   


 


Monocytes (%) (Auto)  6.7 %   


 


Eosinophils (%) (Auto)  3.4 %   


 


Basophils (%) (Auto)  0.8 %   


 


Neutrophils # (Auto)  6.3 TH/MM3   


 


Lymphocytes # (Auto)  1.4 TH/MM3   


 


Monocytes # (Auto)  0.6 TH/MM3   


 


Eosinophils # (Auto)  0.3 TH/MM3   


 


Basophils # (Auto)  0.1 TH/MM3   


 


CBC Comment  DIFF FINAL   


 


Differential Comment     


 


Blood Urea Nitrogen  11 MG/DL   


 


Creatinine  0.81 MG/DL   


 


Random Glucose  111 MG/DL   


 


Total Protein  5.6 GM/DL   


 


Albumin  3.1 GM/DL   


 


Calcium Level  8.1 MG/DL   


 


Phosphorus Level  3.6 MG/DL   


 


Magnesium Level  1.8 MG/DL   


 


Alkaline Phosphatase  59 U/L   


 


Aspartate Amino Transf


(AST/SGOT) 


  55 U/L 


  


  


 


 


Alanine Aminotransferase


(ALT/SGPT) 


  25 U/L 


  


  


 


 


Total Bilirubin  0.8 MG/DL   


 


Sodium Level  142 MEQ/L   


 


Potassium Level  3.8 MEQ/L   


 


Chloride Level  110 MEQ/L   


 


Carbon Dioxide Level  24.5 MEQ/L   


 


Anion Gap  8 MEQ/L   


 


Estimat Glomerular Filtration


Rate 


  67 ML/MIN 


  


  


 


 


Troponin I  6.59 NG/ML   5.38 NG/ML 











Assessment and Plan


Problem List:  


(1) Wide-complex tachycardia


ICD Codes:  I47.2 - Ventricular tachycardia


Status:  Acute


Plan:  Stable overnight after converting to NSR on IV Amiodarone.  Patient 

possibly with left ventricular aneurysm seen on cath today, possibly the focus 

of the VT.  





REC continue IV Amiodarone


         consult Dr. Cordero for his opinion


         check echo now that patient back in NSR to see if what we see on left 

ventriculography is an aneurysm and not VSD (doubt)





(2) CAD (coronary artery disease)


ICD Codes:  I25.10 - Atherosclerotic heart disease of native coronary artery 

without angina pectoris


Status:  Chronic


Plan:  Stable CAD status.  Cath today shows widely patent 4 of 4 bypass grafts.

  Suspect elevation in troponin due to her sustained VT.  





(3) Cardiomyopathy


ICD Codes:  I42.9 - Cardiomyopathy, unspecified


Status:  Chronic


Plan:  EF difficult to estimate on cath today, ~ 30%.  Patient possibly with 

left ventricular aneurysm seen on cath today.  No CHF.   Recommend resume her 

beta blocker, add an ACE-I.  





Code Status


full code


Discussed Condition With


patient and family, at length





Problem Qualifiers





(1) CAD (coronary artery disease):  


Qualified Codes:  I25.10 - Atherosclerotic heart disease of native coronary 

artery without angina pectoris


(2) Cardiomyopathy:  


Qualified Codes:  I25.5 - Ischemic cardiomyopathy








Edin Dickerson MD May 18, 2018 12:33

## 2018-05-18 NOTE — ECHRPT
Indication:   Aneurysm of heart

 

 CONCLUSIONS

 Mildly dilated left ventricle. 

 Mild concentric left ventricular hypertrophy. 

 The left ventricular systolic function is moderate-to-severly reduced with an estimated ejection fra
ction in 

 the range of 35-40%. 

 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

 The right ventricle is mildly dilated. 

 The right ventricular systoilc function is normal. 

 The left atrial size is moderately dilated. 

 The aortic root and proximal ascending aorta are not well visualized. 

 Mild thickening of the mitral valve leaflets. 

 Moderate mitral valve regurgitation. 

 Diffuse calcification of the aortic valve. 

 No aortic valve regurgitation. 

 No tricuspid regurgitation. 

 

 

 

 

 BP:        /         HR:                          Rhythm:

 

 MEASUREMENTS  (Male / Female) Normal Values       Technical Quality:Technically difficult study

 2D ECHO

 LV Diastolic Diameter PLAX        6.9 cm                4.2 - 5.9 / 3.9 - 5.3 cm

 LV Systolic Diameter PLAX         5.9 cm                

 IVS Diastolic Thickness           1.3 cm                0.6 - 1.0 / 0.6 - 0.9 cm

 LVPW Diastolic Thickness          1.4 cm                0.6 - 1.0 / 0.6 - 0.9 cm

 LV Relative Wall Thickness        0.4                   

 RV Internal Dim ED PLAX           2.4 cm                

 LVOT Diameter                     2.0 cm                

 

 M-MODE

 Aortic Root Diameter MM           3.2 cm                

 LA Systolic Diameter MM           4.9 cm                

 LA Ao Ratio MM                    1.5                   

 AV Cusp Separation MM             1.4 cm                

 

 DOPPLER

 LVOT Peak Velocity                111.0 cm/s            

 LVOT Peak Gradient                4.9 mmHg              

 LVOT Velocity Time Integral       21.4 cm               

 Mitral E Point Velocity           97.7 cm/s             

 Mitral A Point Velocity           109.0 cm/s            

 Mitral E to A Ratio               0.9                   

 LV E' Septal Velocity             3.9 cm/s              

 Mitral E to LV E' Septal Ratio    25.1                  

 

 

 FINDINGS

 

 LEFT VENTRICLE

 Mildly dilated left ventricle. 

 Mild concentric left ventricular hypertrophy. 

 The left ventricular systolic function is moderate-to-severly reduced with an estimated ejection fra
ction in 

 the range of 35-40%. 

 Inferobasal and posterior dyskinesis with aneurysm.

 

 RIGHT VENTRICLE

 The right ventricle is mildly dilated. 

 The right ventricular systoilc function is normal. 

 

 LEFT ATRIUM

 The left atrial size is moderately dilated. 

 

 RIGHT ATRIUM

 The right atrial size is normal. 

 

 ATRIAL SEPTUM

 Normal atrial septal thickness without atrial level shunting by limited color doppler interrogation.
 

 

 AORTA

 The aortic root and proximal ascending aorta are not well visualized. 

 

 MITRAL VALVE

 Mild thickening of the mitral valve leaflets. 

 Moderate mitral valve regurgitation. 

 

 AORTIC VALVE

 Diffuse calcification of the aortic valve. 

 No aortic valve regurgitation. 

 

 TRICUSPID VALVE

 No tricuspid regurgitation. 

 

 PULMONARY VALVE

 No pulmonary valve regurgitation or stenosis. 

 

 VESSELS

 The inferior vena cava is normal in size. 

 

 PERICARDIUM

 No pericardial effusion. 

 

 

 

 

  Galen Solano MD, FACC

  (Electronically Signed)

  Final Date:18 May 2018 16:08

## 2018-05-18 NOTE — MA
cc:

Edin Dickerson MD, Alan S MD

****

 

 

DATE:

05/18/2018

 

DATE OF PROCEDURE:

05/18/2018

 

PROCEDURE PERFORMED:

Left heart catheterization, selective coronary and graft 

arteriography, left ventriculography.

 

PROCEDURE NOTES:

The patient was brought to the cardiac catheterization laboratory in a

fasting state after having signed informed consent.  The right groin 

was prepped and draped as per policy and anesthetized with 1% 

lidocaine.  Arterial access was obtained via the right femoral artery 

and a 6-[place] sheath placed.  Coronary arteriography was performed 

using 6-[place] Papa left 4.0 and right progressive catheters.  Left

ventriculography was done using a standard 6-[place] pigtail.  The mammary

artery aftery graft was engaged with the progressive right catheter.

The vein graft to the right coronary artery was 

engaged with a multipurpose catheter.  The vein graft to the obtuse 

marginal and vein graft to the diagonal were engaged with a left 

coronary bypass catheter.  There were no apparent immediate 

complications.  Her arteriotomy site was closed with VASCADE with the 

achievement of good hemostasis.

 

HEMODYNAMIC DATA:

Left ventricle 142 with an end-diastolic pressure of 18.  Aorta 143/73

with a mean of 104.  There was no significant transvalvular aortic 

gradient on pullback of the pigtail catheter.

 

CORONARY ARTERIOGRAPHY:

The left main may have up to 20% proximal stenosis.  The left anterior

descending is totally occluded proximally.  The left circumflex has 

90% stenosis in its mid portion.  Competitive flow is evident 

distally.  There is a very small first obtuse marginal which has 

diffuse, up to 40% disease at its ostium and a small second obtuse 

marginal which may have up to 50% ostial stenosis.  The right coronary

artery is totally occluded in its mid portion after the takeoff of a 

very small caliber right ventricular branch.

 

GRAFT ANGIOGRAPHY:

The left internal mammary artery to the LAD is widely patent.  The 

very distal LAD appears to have diffuse, up to 50% disease.  The vein 

graft to the diagonal is widely patent.  The vein graft to the obtuse 

marginal is widely patent.  The vein graft to the right coronary 

artery is widely patent.

 

LEFT VENTRICULOGRAPHY:

Contrast injection of the left ventricle reveals probable akinesis of 

the mid to basal inferior wall.  There is also moderate mitral 

regurgitation.  Lastly, there is suggestion of an aneurysm in the 

region of the basal inferior wall.  Ejection fraction is somewhat 

difficult to estimate, probably approximately 30%.

 

CONCLUSIONS:

1.  Severe 3-vessel native coronary artery disease.

2.  Widely patent 4/4 bypass grafts including left internal mammary 

artery to the left anterior descending and 3 separate vein grafts to 

the diagonal, obtuse marginal, right coronary artery.

3.  Reduced left ventricular systolic function with ejection fraction 

roughly estimated at 30%.  There is also suggestion of aneurysmal 

formation in the region of the basal inferior wall.

4.  Moderate mitral regurgitation.

 

 

__________________________________

MD PERLITA Adkins/HUMAIRA

D: 05/18/2018, 12:22 PM

T: 05/18/2018, 12:53 PM

Visit #: [id number]

Job #: [id number]

BRI

## 2018-05-18 NOTE — CATHPROC
[employer] HIS Report

Study Information

Study Number   Scheduled Start         Study Start

 

[id number]   05/18/2018            May 18 2018 10:57AM

 

Referring Institution          Admit Source                 Facility Department

 

1                    Other                     [facility] - Cath Lab

 

Physician and Clinical Staff

Initial Edin Norton      Circulator        Fernandez Roper,RN

 

                    Recorder         Samir RAYMUNDO, Leigh Jean,RT(R)

 

Procedures Performed

Procedure                Location (Site)              Vessel Name

 

Angiogram LV              LV                     Ventricle

Coronary Angiograms           LCA                    Left Coronary

Coronary Angiograms           SVG-DIAG                  Left Coronary

Coronary Angiograms           SVG-OM                   CIRC

Coronary Angiograms           SVG-RCA                  Right Coronary

Coronary Angiograms           MOSQUERA                    MOSQUERA

L Heart Cath

Wire insertion             Fem Art (right)              Femoral Art

Equipment

Time           Description             Size          Mfg Part Number  Used/Scraped

                   TRANSDUCER, TRUWAVE                    [id number]

11:34   [employer]                         *                    Used

                   W/STOCKCOCK                        *[id number]

                                                 [id number]

12:00   [employer] MEDICAL       VASCADE, FR6 CLOSURE SYSTEM FR 6\7                      Used

                                                 *[id number]

                                                 [phone]



                                                 *[id number]

                                                 [phone]



                                                 *[id number]

                                                 534-672T



                                                 *[id number]

                                                 534-642T



                                                 *[id number]

                                                 534-650S



                                                 *[id number]

                   WIRE, HYDROSTEER 150CM                   [id number]

11:47   [employer]                      150CM                  Used

                   ANGLED GLIDE                        *[id number]

                                                 [id number]

11:34   [employer]     PACK, CCL CUSTOM          *                    Used

                                                 *[id number]

                                                 [id number]

11:34   [employer]        PEN, SKIN DUAL W/ RULER       *                    Used

                                                 *[id number]

                                                 [id number]

11:34   [employer]        SHEATH, FR6.5 PRELUDE 11CM     FR 6.5         038ACT      Used

                                                 *[id number]

                                                 [id number]

11:34   [employer]        WIRE, 3MMJ .035 180CM        180CM                  Used

                                                 *[id number]

                                                 [id number]

11:34   [employer]            MANIFOLD, 4 PORT          *                    Used

                                                 *[id number]

11:34   [employer]           OMNIPAQUE, 350 MG, 150ML      150ML         [id number]      Used

                                                 [id number]

11:34   [employer]        BLANKET,WARM AIR CCL        *                    Used

                                                 *[id number]

 

Equipment Model, Serial, Lot Number and Expiration Data

Description              Model Number       Serial Number       Lot Number        Expiration Date

WIRE, HYDROSTEER 150CM

                                             [id number]         09-

ANGLED GLIDE

 

History: Allergies

Allergy             Reaction

 

NKDA

 

 

History: Risk Factors

                     Family History of

Hypertension   Dyslipidemia                 Previous MI    Previous Heart Failure

                     Premature CAD

Yes        Yes          No           Yes        No

Prior Valve

         Prior PCI       Prior CABG     Prior CABGDate

Surgery

No        Yes          Yes         01/01/2001

         Cerebrovascular    Peripheral Artery   Chronic Lung

On Dialysis                                    Diabetes

         Disease        Disease        Disease

No        No           No           No         No

 

 

History: Stress Tests

Stress or Imaging Studies Performed

 

No

History: Other

Current Smoker     Method       Quit            Packs a Day      Years Used      Pack Years

 

No           Cigarettes     32 Years Ago        1           30          30

 

Labs

Hgb (g/dl)       Hct (%)          WBC (l/cumm)        Platelets (thousands)

 

11.60-17.00      35.00-51.00        4.00-11.00         150..00

 

11.7          33.9           8.6            115

 

Glucose (mg/dl)    BUN (mg/dl)        Creatinine (mg/dl)    BUN:Creatinine (1:x)

74..00      7.00-18.00        0.50-1.30        10.00-20.00

 

111          11            0.8           13.8

 

Na (meq/l)       K (meq/l)

 

136..00     3.50-5.10

 

142          3.8

 

INR (PTT:PT)

 

0.90-1.10

 

1

 

Troponin I (ng/ml)   CPK-MB (ng/ML)

 

0.02-0.05       0.50-3.60

 

6.6          Not Drawn

 

 

 

 

Medication

Medication Total Dose (Bolus/Oral)

Medication             Total Dosage/Unit

 

1% XYLOCAINE               10 mL

 

FENTANYL                 50 mcg

 

VERSED                   2 mg

 

Medications (Bolus/Oral)

Medication          Time Given         Dosage/Unit       Administered By     Reason

 

1% XYLOCAINE         5/18/2018 11:37:18 AM      10 mL        Edin Dickerson

10 mL 1% XYLOCAINE given by Edin Dickerson in Right Groin via Subcutaneous. Ordered by Edin Dickerson.

 

VERSED            5/18/2018 11:37:36 AM      2 mg        Judson, Fernandez

2 mg VERSED given by Fernandez Roper RN via Peripheral IV. Ordered by Edin Dickerson.

 

FENTANYL           5/18/2018 11:38:34 AM      50 mcg        Judson, Fernandez

50 mcg FENTANYL given by Fernandez Roper RN via Peripheral IV. Ordered by Edin Dickerson.

Initial Case Assessment

Cardiovascular

HR           NIBP           Chest Pain

 

72           137/92          0

 

Edema Present      Skin color           Skin

 

Mild           Normal             Warm Dry

 

Neurological State

            Oriented to time-place-

Alert                      Moves all extremities

            person

 

Final Case Assessment

Cardiovascular

HR           Rhythm          NIBP           Chest Pain

 

96           sr            146/99          0

 

Edema Present      Skin color           Skin

 

None           Normal             Warm Dry

 

Circulatory - Right Pulses

Dorsalis Pedis     Femoral

 

2            2

 

Scale (0,1,2,3,4,d)

 

Circulatory - Left Pulses

Dorsalis Pedis     Femoral

 

2            2

 

Scale (0,1,2,3,4,d)

 

Neurological State

            Oriented to time-place-

Alert                      Moves all extremities

            person

 

Respiration - General

Respiration Rate

            SpO2 (%)         O2 (lpm)

(B/min)

18           96            0

 

Chronological Log

Time    Study Chronological Log

 

11:13:30  Patient arrived via Bed.

 

11:14:17  Patient Name, D.O.B, / Armband Verified By R.N.

 

11:14:23  Consent signed by the physician and the patient and verified by the Cath Lab staff.

 

11:15:31  Pre-op and post- op instructions given; patient acknowledges understanding of instructions.


 

11:20:36  Presedation assessment performed by Cath Lab RN.

      Vitals capture started with the following parameters, Patient=Adult, Interval=5 min, Initial Pr
essure=160 mmHg,

11:20:56

      Deflation Rate=5 mmHg, Cuff placed on Right Arm

11:21:26  OB=418 bpm, SVMZ=238/92 mmhg, SpO2=93.0 %, Altaf=9

 

11:25:38  Patient has been NPO for More than 6Hrs.

 

11:27:10  AM=246 bpm, SDNW=749/99 mmhg, SpO2=98.0 %, Resp=17 B/min

11:27:10  MD paged

 

11:27:45  Skin Breakdown- none reported

 

11:27:58  Patient Warmer Placed on the Table.

 

11:28:02  Disposable Defibrillator Pads On Patient.

 

11:28:13  Javan Prominences Protected

 

11:28:16  A # 20 IV was noted in the Antecubital (left). Grade = 0

 

11:29:32  Right groin prepped with 2% chlorhexidine, and draped after a 3 min. waiting time.

 

11:31:34  HR=73 bpm, DRBJ=518/88 mmhg, SpO2=98.0 %, Resp=21 B/min, Rushing=2

 

11:33:52  MD arrived.

      Assessment: Initial Case, HR=72 BPM, KAHH=394/92 mmhg, Chest Pain=0, Edema=Mild, Color=Normal, 
Skin = Warm,

11:35:07  Dry

      Neurological: State=Alert, Ox3, BAXTER

11:36:33  DY=983 bpm, SSEZ=415/103 mmhg, SpO2=99.0 %, Resp=19 B/min, Altaf=9, Rushing=2

      Time Out. Correct patient, correct procedure, correct physician, labs, allergies, and equipment
 verified with cath lab

11:36:49  team present. Fire risk assesment completed (see hard stop sheet for coding). Time Out Conc
urred by MD and

      individual staff in procedure.

11:37:05  Case Start

 

11:37:18  10 mL 1% XYLOCAINE given by Edin Dickerson in Right Groin via Subcutaneous. Ordered by Edin Dickerson.

 

11:37:36  2 mg VERSED given by Fernandez Roper RN via Peripheral IV. Ordered by Edin Dickerson.

 

11:38:34  50 mcg FENTANYL given by Fernandez Roper RN via Peripheral IV. Ordered by Edin Dickerson.

 

11:39:17  Access site was Right Femoral Artery.

 

11:39:37  Pressure channel 1 zeroed.

 

11:41:04  A JL 4.0 INFINITI CATHETER FR 6 was advanced over a wire. contrast was used for injections.


 

11:41:39  HR=99 bpm, OURF=538/92 mmhg, SpO2=91.0 %, Resp=19 B/min, Altaf=9, Rushing=2

      Recorded Pressure: Ao, NA=372, Condition=Condition 1

11:42:03

      (Aorta) Ao 150/84/113

11:42:33  The  LCA was injected and visualized at various angles. contrast used.

 

11:43:21  Catheter was removed

 

11:43:25  A LCB INFINITI CATHETER FR 6 was advanced over a wire. contrast was used for injections.

 

11:46:36  HR=95 bpm, OZUU=571/84 mmhg, SpO2=95.0 %, Resp=16 B/min, Altaf=9, Rushing=2

 

11:48:03  A WIRE, HYDROSTEER 150CM ANGLED GLIDE 150CM was inserted via Fem Art (right).

 

11:48:45  The LIMA was injected and visualized at various angles. contrast used.

 

11:50:09  The SVG-DIAG was injected and visualized at various angles. OMNIPAQUE, 350 MG, 150ML 150ML 
used.

 

11:51:35  HR=97 bpm, RXXF=147/87 mmhg, SpO2=96.0 %, Resp=18 B/min, Rushing=2

 

11:51:42  The SVG-OM was injected and visualized at various angles. OMNIPAQUE, 350 MG, 150ML 150ML us
ed.

      After removing the current catheter a MPA-2 INFINITI CATHETER FR 6 was advanced over a WIRE, 3M
MJ .035 180CM

11:52:14

      180CM.

11:53:50  The SVG-RCA was injected and visualized at various angles. OMNIPAQUE, 350 MG, 150ML 150ML u
sed.

      After removing the current catheter a PIGTAIL STR INFINITI CATHETER FR 6 was advanced over a WI
RE, 3MMJ .035

11:55:14

      180CM 180CM.

      Recorded Pressure: LV, HR=97, Condition=Condition 1

11:55:52

      (Left Ventricle) /14/17

11:56:34  HR=97 bpm, BQRM=191/91 mmhg, SpO2=94.0 %, Resp=21 B/min, Rushing=2

 

11:57:22  The LV was injected at 12 cc/sec for a total of 42. OMNIPAQUE, 350 MG, 150ML 150ML used.

       Recorded Pressure: LV, Ao, CX=837, Condition=Condition 1

11:58:05   (Left Ventricle) /13/18,

       (Aorta) Ao 143/73/104

12:00:28   Case End

 

12:01:35   HR=97 bpm, SSMM=707/95 mmhg, SpO2=94.0 %, Resp=18 B/min, Rushing=2

       Assessment: Final Case, HR=96 BPM, Rhythm=sr, XXWF=269/99 mmhg, Chest Pain=0, Edema=None, [place]
r=Normal,

       Skin = Warm, Dry

       Right Pulses: Roosevelt Ped=2, Femoral=2

12:05:34

       Left Pulses: Roosevelt Ped=2, Femoral=2

       Neurological: State=Alert, Ox3, BAXTER

       Respiration: Resp=18 B/min, SpO2=96 %, O2=0 lpm

12:06:36   HR=95 bpm, LFSL=562/99 mmhg, SpO2=95.0 %, Resp=20 B/min, Rushing=2

 

12:07:33   Catheter(s) removed without difficulty

 

12:07:37   Sterile dressing applied to site

 

12:07:39   No case complications noted.

 

12:07:54   Cine recording checked.

 

12:07:56   Bedside Report will be given.

 

12:08:08   Implantable Device card placed in patient's chart.

 

12:08:16   A Left Heart Cath was performed.

 

12:11:35   HR=94 bpm, HMBW=043/95 mmhg, SpO2=94.0 %, Resp=18 B/min, Rushing=2

 

12:12:26   Vitals capture stopped.

 

12:15:41   [facility]

 

 

 

 

End Study - Contrast Media Used In Study

Contrast        Total Opened (mL)     Total Used (mL)     Total Wasted (mL)

 

Omnipaque       140            140           0

 

End Study - Maximum Contrast Load

Max Contrast Load (mL)

 

481.3

 

End Study - Radiation Exposure

Fluoro Time

(minutes)

6.3

 

 

End Study - Patient Disposition

Complications     Transferred To

 

No           Critical Care Bed

## 2018-05-18 NOTE — HHI.CCPN
Subjective


Remarks/Hospital Course


Patient is an 84-year-old female with past medical history of  coronary artery 

disease, previous MI in 1986, status post CABG in  2001, hypertension, 

hyperlipidemia and lupus who presented to [facility] ED from Dr. Stokes's office after she was found to  be in a wide complex tachycardia 

during an echocardiogram.  The  patient denies any palpitations, lightheadedness

, shortness of breath, chest pain, syncope; however, she reports chronic edema 

of the left  lower extremity.  EKG showed wide complex tachycardia and the 

patient  was given adenosine 6 mg and 12 mg bolus without any effect on her  

heart rate.  She was subsequently given amiodarone bolus which was  repeated 

and placed on amiodarone drip.  The patient was seen by Dr. Dickerson from 

Cardiology service.  Her laboratory data showed elevated  troponin at 1.84.  A 

chest x-ray in the ED showed compensated  cardiomegaly, atelectatic changes 

left hemidiaphragm and laterally in  the left perihilar distribution.  The 

patient denies any fever, chills or any constitutional symptoms.  She denies 

any similar presentation  in the past.





5/18 Patient is awake and alert on Amio and Heparin drips. For cardiac cath 

today. Troponin increased to 6.59 from 1.84 on arrival. Denies any CP or SOB.





Objective





Vital Signs








  Date Time  Temp Pulse Resp B/P (MAP) Pulse Ox O2 Delivery O2 Flow Rate FiO2


 


5/18/18 06:00  58      


 


5/18/18 04:00 98.1  19 134/74 (94) 99   


 


5/17/18 15:04      Room Air  














Intake and Output   


 


 5/18/18 5/18/18 5/19/18





 08:00 16:00 00:00


 


Intake Total 2334 ml  


 


Output Total 525 ml  


 


Balance 1809 ml  








Result Diagram:  


5/18/18 0156                                                                   

             5/18/18 0156





Other Results





Laboratory Tests








Test


  5/17/18


13:20 5/17/18


13:30 5/17/18


17:30 5/17/18


20:04


 


White Blood Count 10.2 TH/MM3    


 


Red Blood Count 4.01 MIL/MM3    


 


Hemoglobin 13.1 GM/DL    


 


Hematocrit 39.3 %    


 


Mean Corpuscular Volume 98.0 FL    


 


Mean Corpuscular Hemoglobin 32.6 PG    


 


Mean Corpuscular Hemoglobin


Concent 33.3 % 


  


  


  


 


 


Red Cell Distribution Width 13.6 %    


 


Platelet Count 147 TH/MM3    


 


Mean Platelet Volume 10.0 FL    


 


Neutrophils (%) (Auto) 82.9 %    


 


Lymphocytes (%) (Auto) 9.7 %    


 


Monocytes (%) (Auto) 5.8 %    


 


Eosinophils (%) (Auto) 1.3 %    


 


Basophils (%) (Auto) 0.3 %    


 


Neutrophils # (Auto) 8.4 TH/MM3    


 


Lymphocytes # (Auto) 1.0 TH/MM3    


 


Monocytes # (Auto) 0.6 TH/MM3    


 


Eosinophils # (Auto) 0.1 TH/MM3    


 


Basophils # (Auto) 0.0 TH/MM3    


 


CBC Comment DIFF FINAL    


 


Differential Comment     


 


Blood Urea Nitrogen 12 MG/DL    


 


Creatinine 0.97 MG/DL    


 


Random Glucose 112 MG/DL    


 


Total Protein 6.4 GM/DL    


 


Albumin 3.7 GM/DL    


 


Calcium Level 8.6 MG/DL    


 


Alkaline Phosphatase 67 U/L    


 


Aspartate Amino Transf


(AST/SGOT) 35 U/L 


  


  


  


 


 


Alanine Aminotransferase


(ALT/SGPT) 25 U/L 


  


  


  


 


 


Total Bilirubin 0.9 MG/DL    


 


Sodium Level 143 MEQ/L    


 


Potassium Level 4.1 MEQ/L    


 


Chloride Level 107 MEQ/L    


 


Carbon Dioxide Level 23.1 MEQ/L    


 


Anion Gap 13 MEQ/L    


 


Estimat Glomerular Filtration


Rate 55 ML/MIN 


  


  


  


 


 


Troponin I 1.84 NG/ML    5.05 NG/ML 


 


Prothrombin Time  10.2 SEC   


 


Prothromb Time International


Ratio 


  1.0 RATIO 


  


  


 


 


Activated Partial


Thromboplast Time 


  24.9 SEC 


  


  


 


 


Nasal Screen MRSA (PCR)


  


  


  MRSA NOT


DETECTED 


 


 


Test


  5/17/18


23:35 5/18/18


01:56 5/18/18


06:52 


 


 


Activated Partial


Thromboplast Time 43.6 SEC 


  


  51.6 SEC 


  


 


 


White Blood Count  8.6 TH/MM3   


 


Red Blood Count  3.51 MIL/MM3   


 


Hemoglobin  11.7 GM/DL   


 


Hematocrit  33.9 %   


 


Mean Corpuscular Volume  96.8 FL   


 


Mean Corpuscular Hemoglobin  33.4 PG   


 


Mean Corpuscular Hemoglobin


Concent 


  34.5 % 


  


  


 


 


Red Cell Distribution Width  13.6 %   


 


Platelet Count  115 TH/MM3   


 


Mean Platelet Volume  9.4 FL   


 


Neutrophils (%) (Auto)  72.8 %   


 


Lymphocytes (%) (Auto)  16.3 %   


 


Monocytes (%) (Auto)  6.7 %   


 


Eosinophils (%) (Auto)  3.4 %   


 


Basophils (%) (Auto)  0.8 %   


 


Neutrophils # (Auto)  6.3 TH/MM3   


 


Lymphocytes # (Auto)  1.4 TH/MM3   


 


Monocytes # (Auto)  0.6 TH/MM3   


 


Eosinophils # (Auto)  0.3 TH/MM3   


 


Basophils # (Auto)  0.1 TH/MM3   


 


CBC Comment  DIFF FINAL   


 


Differential Comment     


 


Blood Urea Nitrogen  11 MG/DL   


 


Creatinine  0.81 MG/DL   


 


Random Glucose  111 MG/DL   


 


Total Protein  5.6 GM/DL   


 


Albumin  3.1 GM/DL   


 


Calcium Level  8.1 MG/DL   


 


Phosphorus Level  3.6 MG/DL   


 


Magnesium Level  1.8 MG/DL   


 


Alkaline Phosphatase  59 U/L   


 


Aspartate Amino Transf


(AST/SGOT) 


  55 U/L 


  


  


 


 


Alanine Aminotransferase


(ALT/SGPT) 


  25 U/L 


  


  


 


 


Total Bilirubin  0.8 MG/DL   


 


Sodium Level  142 MEQ/L   


 


Potassium Level  3.8 MEQ/L   


 


Chloride Level  110 MEQ/L   


 


Carbon Dioxide Level  24.5 MEQ/L   


 


Anion Gap  8 MEQ/L   


 


Estimat Glomerular Filtration


Rate 


  67 ML/MIN 


  


  


 


 


Troponin I  6.59 NG/ML   








Imaging





Last Impressions








Chest X-Ray 5/17/18 0000 Signed





Impressions: 





 Service Date/Time:  Thursday, May 17, 2018 15:53 - CONCLUSION:  1. Compensated 





 cardiomegaly. 2. Atelectatic changes above the left hemidiaphragm and 

laterally 





 in the left perihilar distribution.     Rashaun Esteban MD 








Objective Remarks


GENERAL: Patient is 83 yo lying n bed in NAD


SKIN: Warm and dry.


HEAD: Normocephalic.


EYES: No scleral icterus. No injection or drainage. 


NECK: Supple, trachea midline. No JVD or lymphadenopathy.


CARDIOVASCULAR: Regular rate and rhythm without murmurs, gallops, or rubs. 


RESPIRATORY: Breath sounds equal bilaterally. No accessory muscle use.


GASTROINTESTINAL: Abdomen soft, non-tender, nondistended. 


MUSCULOSKELETAL: No cyanosis, or edema. 


Neuro: Awake and alert








A/P


Assessment and Plan


1.  Wide complex tachycardia.


2.  Elevated troponin


3.  History of CAD and CABG


4.  Hypertension.


5.  Hyperlipidemia.


6.  History of lupus.


 


Plan





Neuro: Monitor neuro status and avoid any sedatives.





Pulm: Oxygen p.r.n. to maintain sats > 92%.


         Bronchodilators on a p.r.n. basis.





CV: Monitor HR and BP maintain MAP> 65 mmHg.


      Continue aspirin 325 mg daily, Amiodarone drip.


      Monitor troponins- On heparin drip per 


      For cardiac cath today by Dr. Dickerson





: Monitor renal function, I's and O's and electrolyte replacement per 

protocol.


       Continue with IVF NS@75ml/hr





GI: on Pepcid for Gl prophylaxis.





ID: Monitor for signs of infection( fever and WBC). 





Heme: Monitor CBC and coags- on a heparin drip.





Endo: SSI to maintain Euglycemia





GI prophylaxis with Pepcid and DVT prophylaxis with SCD's and on Heparin drip.





Level 2











Edu Hart MD May 18, 2018 07:53

## 2018-05-18 NOTE — EKG
Date Performed: 05/17/2018       Time Performed: 12:57:11

 

PTAGE:      84 years

 

EKG:      Rhythm is a rapid wide complex tachycardia, consistent with ventricular tachycardia. Clinic
al correlation is recommended. 

 

NO PREVIOUS TRACING            

 

DOCTOR:   Seb Jenkins  Interpretating Date/Time  05/18/2018 12:19:52

## 2018-05-18 NOTE — EKG
Date Performed: 05/17/2018       Time Performed: 13:18:22

 

PTAGE:      84 years

 

EKG:      Wide complex tachycardia remains present, consistent with ventricular tachycardia. 

 

 PREVIOUS TRACING            : 05/17/2018 12.57

 

DOCTOR:   Seb Jenkins  Interpretating Date/Time  05/18/2018 12:20:18

## 2018-05-18 NOTE — EKG
Date Performed: 05/17/2018       Time Performed: 13:23:32

 

PTAGE:      84 years

 

EKG:      Wide complex tachycardia consistent with ventricular tachycardia. Clinical correlation is r
ecommended. 

 

 PREVIOUS TRACING            : 05/17/2018 13.18 Since the previous tracing, no significant change not
ed

 

DOCTOR:   Seb Jenkins  Interpretating Date/Time  05/18/2018 12:20:52

## 2018-05-19 VITALS
HEART RATE: 104 BPM | RESPIRATION RATE: 26 BRPM | OXYGEN SATURATION: 98 % | SYSTOLIC BLOOD PRESSURE: 146 MMHG | DIASTOLIC BLOOD PRESSURE: 89 MMHG | TEMPERATURE: 100.3 F

## 2018-05-19 VITALS
TEMPERATURE: 98.5 F | SYSTOLIC BLOOD PRESSURE: 123 MMHG | RESPIRATION RATE: 17 BRPM | HEART RATE: 66 BPM | OXYGEN SATURATION: 98 % | DIASTOLIC BLOOD PRESSURE: 65 MMHG

## 2018-05-19 VITALS
TEMPERATURE: 97.9 F | OXYGEN SATURATION: 96 % | RESPIRATION RATE: 16 BRPM | SYSTOLIC BLOOD PRESSURE: 145 MMHG | DIASTOLIC BLOOD PRESSURE: 71 MMHG | HEART RATE: 57 BPM

## 2018-05-19 VITALS — HEART RATE: 107 BPM

## 2018-05-19 VITALS
TEMPERATURE: 98 F | OXYGEN SATURATION: 98 % | RESPIRATION RATE: 21 BRPM | DIASTOLIC BLOOD PRESSURE: 76 MMHG | HEART RATE: 90 BPM | SYSTOLIC BLOOD PRESSURE: 140 MMHG

## 2018-05-19 VITALS
TEMPERATURE: 98.5 F | SYSTOLIC BLOOD PRESSURE: 129 MMHG | OXYGEN SATURATION: 95 % | HEART RATE: 64 BPM | RESPIRATION RATE: 30 BRPM | DIASTOLIC BLOOD PRESSURE: 69 MMHG

## 2018-05-19 VITALS — HEART RATE: 61 BPM

## 2018-05-19 VITALS
RESPIRATION RATE: 27 BRPM | HEART RATE: 63 BPM | SYSTOLIC BLOOD PRESSURE: 138 MMHG | DIASTOLIC BLOOD PRESSURE: 72 MMHG | OXYGEN SATURATION: 89 % | TEMPERATURE: 98.9 F

## 2018-05-19 VITALS — HEART RATE: 102 BPM

## 2018-05-19 VITALS — HEART RATE: 70 BPM

## 2018-05-19 VITALS — HEART RATE: 65 BPM

## 2018-05-19 LAB
ALBUMIN SERPL-MCNC: 3.1 GM/DL (ref 3.4–5)
ALP SERPL-CCNC: 62 U/L (ref 45–117)
ALT SERPL-CCNC: 27 U/L (ref 10–53)
AST SERPL-CCNC: 39 U/L (ref 15–37)
BASOPHILS # BLD AUTO: 0.1 TH/MM3 (ref 0–0.2)
BASOPHILS NFR BLD: 0.7 % (ref 0–2)
BILIRUB SERPL-MCNC: 0.9 MG/DL (ref 0.2–1)
BUN SERPL-MCNC: 12 MG/DL (ref 7–18)
CALCIUM SERPL-MCNC: 7.9 MG/DL (ref 8.5–10.1)
CHLORIDE SERPL-SCNC: 110 MEQ/L (ref 98–107)
CREAT SERPL-MCNC: 0.79 MG/DL (ref 0.5–1)
EOSINOPHIL # BLD: 0.1 TH/MM3 (ref 0–0.4)
EOSINOPHIL NFR BLD: 1.1 % (ref 0–4)
ERYTHROCYTE [DISTWIDTH] IN BLOOD BY AUTOMATED COUNT: 13.5 % (ref 11.6–17.2)
GFR SERPLBLD BASED ON 1.73 SQ M-ARVRAT: 69 ML/MIN (ref 89–?)
GLUCOSE SERPL-MCNC: 116 MG/DL (ref 74–106)
HCO3 BLD-SCNC: 24.1 MEQ/L (ref 21–32)
HCT VFR BLD CALC: 34.7 % (ref 35–46)
HGB BLD-MCNC: 11.9 GM/DL (ref 11.6–15.3)
LYMPHOCYTES # BLD AUTO: 0.7 TH/MM3 (ref 1–4.8)
LYMPHOCYTES NFR BLD AUTO: 7.1 % (ref 9–44)
MAGNESIUM SERPL-MCNC: 1.9 MG/DL (ref 1.5–2.5)
MCH RBC QN AUTO: 32.9 PG (ref 27–34)
MCHC RBC AUTO-ENTMCNC: 34.2 % (ref 32–36)
MCV RBC AUTO: 96.3 FL (ref 80–100)
MONOCYTE #: 0.6 TH/MM3 (ref 0–0.9)
MONOCYTES NFR BLD: 6.3 % (ref 0–8)
NEUTROPHILS # BLD AUTO: 7.9 TH/MM3 (ref 1.8–7.7)
NEUTROPHILS NFR BLD AUTO: 84.8 % (ref 16–70)
PHOSPHATE SERPL-MCNC: 3.3 MG/DL (ref 2.5–4.9)
PLATELET # BLD: 111 TH/MM3 (ref 150–450)
PMV BLD AUTO: 10 FL (ref 7–11)
PROT SERPL-MCNC: 5.7 GM/DL (ref 6.4–8.2)
RBC # BLD AUTO: 3.61 MIL/MM3 (ref 4–5.3)
SODIUM SERPL-SCNC: 143 MEQ/L (ref 136–145)
WBC # BLD AUTO: 9.3 TH/MM3 (ref 4–11)

## 2018-05-19 RX ADMIN — AMIODARONE HYDROCHLORIDE SCH MLS/HR: 50 INJECTION, SOLUTION INTRAVENOUS at 16:52

## 2018-05-19 RX ADMIN — HYDROCODONE BITARTRATE AND ACETAMINOPHEN PRN TAB: 5; 325 TABLET ORAL at 02:32

## 2018-05-19 RX ADMIN — IPRATROPIUM BROMIDE AND ALBUTEROL SULFATE SCH AMPULE: .5; 3 SOLUTION RESPIRATORY (INHALATION) at 16:00

## 2018-05-19 RX ADMIN — PHENYTOIN SODIUM SCH MLS/HR: 50 INJECTION INTRAMUSCULAR; INTRAVENOUS at 07:45

## 2018-05-19 RX ADMIN — CHLORHEXIDINE GLUCONATE SCH PACK: 500 CLOTH TOPICAL at 03:56

## 2018-05-19 RX ADMIN — HUMAN INSULIN SCH: 100 INJECTION, SOLUTION SUBCUTANEOUS at 16:00

## 2018-05-19 RX ADMIN — IPRATROPIUM BROMIDE AND ALBUTEROL SULFATE SCH AMPULE: .5; 3 SOLUTION RESPIRATORY (INHALATION) at 05:01

## 2018-05-19 RX ADMIN — PHENYTOIN SODIUM SCH MLS/HR: 50 INJECTION INTRAMUSCULAR; INTRAVENOUS at 21:29

## 2018-05-19 RX ADMIN — HUMAN INSULIN SCH: 100 INJECTION, SOLUTION SUBCUTANEOUS at 09:32

## 2018-05-19 RX ADMIN — ASPIRIN SCH MG: 325 TABLET, DELAYED RELEASE ORAL at 07:46

## 2018-05-19 RX ADMIN — HUMAN INSULIN SCH: 100 INJECTION, SOLUTION SUBCUTANEOUS at 21:29

## 2018-05-19 RX ADMIN — FAMOTIDINE SCH MG: 20 TABLET, FILM COATED ORAL at 21:29

## 2018-05-19 RX ADMIN — IPRATROPIUM BROMIDE AND ALBUTEROL SULFATE SCH AMPULE: .5; 3 SOLUTION RESPIRATORY (INHALATION) at 21:08

## 2018-05-19 RX ADMIN — HYDROCODONE BITARTRATE AND ACETAMINOPHEN PRN TAB: 5; 325 TABLET ORAL at 21:29

## 2018-05-19 RX ADMIN — AMIODARONE HYDROCHLORIDE SCH MLS/HR: 50 INJECTION, SOLUTION INTRAVENOUS at 12:20

## 2018-05-19 RX ADMIN — AMIODARONE HYDROCHLORIDE SCH MLS/HR: 50 INJECTION, SOLUTION INTRAVENOUS at 03:56

## 2018-05-19 RX ADMIN — IPRATROPIUM BROMIDE AND ALBUTEROL SULFATE SCH AMPULE: .5; 3 SOLUTION RESPIRATORY (INHALATION) at 09:21

## 2018-05-19 RX ADMIN — HYDROCODONE BITARTRATE AND ACETAMINOPHEN PRN TAB: 5; 325 TABLET ORAL at 07:47

## 2018-05-19 RX ADMIN — FAMOTIDINE SCH MG: 20 TABLET, FILM COATED ORAL at 07:46

## 2018-05-19 RX ADMIN — ENALAPRIL MALEATE SCH MG: 10 TABLET ORAL at 08:19

## 2018-05-19 RX ADMIN — HUMAN INSULIN SCH: 100 INJECTION, SOLUTION SUBCUTANEOUS at 03:56

## 2018-05-19 RX ADMIN — METOPROLOL SUCCINATE SCH MG: 50 TABLET, FILM COATED, EXTENDED RELEASE ORAL at 09:00

## 2018-05-19 NOTE — HHI.CCPN
Subjective


Remarks/Hospital Course


Patient is an 84-year-old female with past medical history of  coronary artery 

disease, previous MI in 1986, status post CABG in  2001, hypertension, 

hyperlipidemia and lupus who presented to [facility] ED from Dr. Stokes's office after she was found to  be in a wide complex tachycardia 

during an echocardiogram.  The  patient denies any palpitations, lightheadedness

, shortness of breath, chest pain, syncope; however, she reports chronic edema 

of the left  lower extremity.  EKG showed wide complex tachycardia and the 

patient  was given adenosine 6 mg and 12 mg bolus without any effect on her  

heart rate.  She was subsequently given amiodarone bolus which was  repeated 

and placed on amiodarone drip.  The patient was seen by Dr. Dickerson from 

Cardiology service.  Her laboratory data showed elevated  troponin at 1.84.  A 

chest x-ray in the ED showed compensated  cardiomegaly, atelectatic changes 

left hemidiaphragm and laterally in  the left perihilar distribution.  The 

patient denies any fever, chills or any constitutional symptoms.  She denies 

any similar presentation  in the past.





5/18 Patient is awake and alert on Amio and Heparin drips. For cardiac cath 

today. Troponin increased to 6.59 from 1.84 on arrival. Denies any CP or SOB.





Subjective:


5/19 Having some intermittent hallucinations. Cardiology planning for possible 

EP study Monday. On amiodarone drip. Tachypneic with increased WOB. Sounds wet.





Objective





Vital Signs








  Date Time  Temp Pulse Resp B/P (MAP) Pulse Ox O2 Delivery O2 Flow Rate FiO2


 


5/19/18 22:00  107      


 


5/19/18 20:00 100.3  26 146/89 (108) 98   


 


5/17/18 15:04      Room Air  














Intake and Output   


 


 5/19/18 5/19/18 5/20/18





 08:00 16:00 00:00


 


Intake Total 490 ml  350 ml


 


Output Total 800 ml  950 ml


 


Balance -310 ml  -600 ml








Result Diagram:  


5/19/18 0440                                                                   

             5/19/18 0440





Imaging





Last Impressions








Chest X-Ray 5/17/18 0000 Signed





Impressions: 





 Service Date/Time:  Thursday, May 17, 2018 15:53 - CONCLUSION:  1. Compensated 





 cardiomegaly. 2. Atelectatic changes above the left hemidiaphragm and 

laterally 





 in the left perihilar distribution.     Rashaun Esteban MD 








Objective Remarks


GENERAL: Patient is 83 yo sitting up in [facility] bed, intermittently tearful when 

discussing death of her son and daughter over the last year. 


SKIN: Warm and dry.


HEAD: Normocephalic.


EYES: No scleral icterus. No injection or drainage. 


NECK: Supple, trachea midline. No JVD or lymphadenopathy.


CARDIOVASCULAR: Regular rate and rhythm without murmurs, gallops, or rubs. 


RESPIRATORY: Tachypneic with increased work of breathing, coarse breath sounds 

bilaterally with wheeze. 


GASTROINTESTINAL: Abdomen soft, non-tender, nondistended. 


MUSCULOSKELETAL: No cyanosis, trace edema


Neuro: Awake and alert. Oriented x4 and conversant.  Speaking with her daughter 

on the cell phone intermittently.  Moving all extremities without focal deficit.








A/P


Assessment and Plan


1.  Wide complex tachycardia.


2.  Elevated troponin


3.  History of CAD and CABG


4.  Hypertension.


5.  Hyperlipidemia.


6.  History of lupus.


 


Plan





Neuro: Monitor neuro status and avoid any sedatives.





Pulm: Oxygen p.r.n. to maintain sats > 92%.


         Bronchodilators on a p.r.n. basis.


Suspect developing pulm edema and I>0. Check CXR.


Lasix 20 mg IV now. 





CV: 


Wide-complex tachycardia


Inferobasal aneurysm


Monitor HR and BP maintain MAP> 65 mmHg.


      Continue aspirin 325 mg daily, Amiodarone drip.


      Monitor troponins- On heparin drip per 


      Cardiac catheterization per Dr. Dickerson showed patent grafts


Dr. Cordero consulted for possible EP Study on Monday. 








FEN: Monitor renal function, I's and O's and electrolyte replacement per 

protocol.








GI: on Pepcid for Gl prophylaxis.





ID: Monitor for signs of infection( fever and WBC). 





Heme: Monitor CBC and coags- Off heparing drip. 





Endo: SSI to maintain Euglycemia





GI prophylaxis with Pepcid and DVT prophylaxis with SCD's /heparin subcut. 





Level 2











Giselle Arizmendi MD May 19, 2018 23:06

## 2018-05-19 NOTE — PD.CARD.PN
Subjective


Subjective Remarks


No events overnight





Feels tired overall


   No chest pain/SOB





No arrhythmias noted





Objective


Medications





Current Medications








 Medications


  (Trade)  Dose


 Ordered  Sig/Ag


 Route  Start Time


 Stop Time Status Last Admin


 


  (NS Flush)  2 ml  UNSCH  PRN


 IVF  5/17/18 13:30


     


 


 


 Amiodarone HCl


 450 mg/Sodium


 Chloride  250 ml @ 


 33 mls/hr  [id number]


 IV  5/17/18 13:44


    5/19/18 03:56


 


 


  (Ecotrin Ec)  325 mg  DAILY


 PO  5/18/18 09:00


    5/19/18 07:46


 


 


  (Benadryl)  50 mg  ON  CALL


 PO  5/17/18 15:00


 5/21/18 14:59   


 


 


  (Valium)  10 mg  ON  CALL


 PO  5/17/18 15:00


 5/21/18 14:59   


 


 


  (Versed Inj)  1 mg  ON  CALL


 IV PUSH  5/17/18 15:00


 5/21/18 14:59   


 


 


  (Pepcid)  20 mg  Q12HR


 PO  5/17/18 21:00


    5/19/18 07:46


 


 


  (Duoneb Neb)  1 ampule  Q6HR  NEB


 INH  5/17/18 16:00


    5/19/18 09:21


 


 


  (Duoneb Neb)  1 ampule  Q2HR NEB  PRN


 INH  5/17/18 15:45


     


 


 


  (Misc Nursing


 Information)  1  [id number]


 XX  5/17/18 15:45


     


 


 


  (Chlorhexidine


 2% Cloth)  3 pack


 Taper  DAILY@04


 TOP  5/18/18 04:00


 5/14/19 03:59  5/19/18 03:56


 


 


  (Chlorhexidine


 2% Cloth)  3 pack  UNSCH  PRN


 TOP  5/17/18 15:45


     


 


 


 Sodium Chloride  1,000 ml @ 


 75 mls/hr  [id number]


 IV  5/17/18 15:45


    5/19/18 07:45


 


 


  (D50w (Vial) Inj)  50 ml  UNSCH  PRN


 IV PUSH  5/17/18 16:00


     


 


 


  (Glucagon Inj)  1 mg  UNSCH  PRN


 OTHER  5/17/18 16:00


     


 


 


  (NovoLIN R


 SUPPLEMENTAL


 SCALE)  1  Q6H


 SQ  5/17/18 16:00


     


 


 


  (Restoril)  7.5 mg  HS  PRN


 PO  5/17/18 20:45


    5/18/18 21:09


 


 


  (Tylenol)  650 mg  Q6H  PRN


 PO  5/17/18 23:30


     


 


 


  (Norco  5-325 Mg)  1 tab  Q4H  PRN


 PO  5/17/18 23:30


    5/19/18 07:47


 


 


  (Morphine Inj)  2 mg  Q2H  PRN


 IV PUSH  5/17/18 23:30


     


 


 


  (Atropine Inj)  0.5 mg  UNSCH  PRN


 IV  5/18/18 12:45


     


 


 


  (Vasotec)  10 mg  DAILY


 PO  5/18/18 17:24


    5/19/18 08:19


 


 


  (Toprol Xl)  50 mg  DAILY


 PO  5/18/18 17:25


    5/19/18 09:00


 








Vital Signs / I&O





Vital Signs








  Date Time  Temp Pulse Resp B/P (MAP) Pulse Ox O2 Delivery O2 Flow Rate FiO2


 


5/19/18 08:47   16     


 


5/19/18 06:00  65      


 


5/19/18 06:00  60  132/65    


 


5/19/18 04:00  58      


 


5/19/18 04:00 97.9 57 16 145/71 (95) 96   


 


5/19/18 03:56  92  132/79    


 


5/19/18 03:00  66  145/71    


 


5/19/18 02:00  65      


 


5/19/18 02:00  70  146/71    


 


5/19/18 00:00  64      


 


5/19/18 00:00 98.5 64 30 129/69 (89) 95   


 


5/18/18 22:00  69      


 


5/18/18 21:09  68  143/76    


 


5/18/18 21:08  98  143/76    


 


5/18/18 20:00 99.0 74 30 139/75 (96) 90   


 


5/18/18 20:00  74      


 


5/18/18 19:21  101 21 140/67 (91) 85   


 


5/18/18 19:00  104 32 149/91 (110) 90   


 


5/18/18 18:00  103      


 


5/18/18 18:00  103 28 154/77 (102) 92   


 


5/18/18 18:00  103 28 154/77 (102) 92   


 


5/18/18 17:00  73 25 151/72 (98) 83   


 


5/18/18 16:00 98.1 111 38 165/89 (114) 89   


 


5/18/18 16:00  111      


 


5/18/18 16:00 98.1 111 38 165/89 (114) 89   


 


5/18/18 15:50  101  139/83    


 


5/18/18 15:00  97 26 137/80 (99) 92   


 


5/18/18 14:30  98 28 148/94 (112) 87   


 


5/18/18 14:15  65 21 144/65 (91) 98   


 


5/18/18 14:00 98.0 97 20 155/80 (105) 98   


 


5/18/18 14:00  97      


 


5/18/18 14:00  97 20 155/80 (105) 98   


 


5/18/18 13:45 98.0 67 23 156/75 (102) 97   


 


5/18/18 13:30  66 23 155/72 (99) 97   


 


5/18/18 13:15  66 19 159/78 (105) 93   


 


5/18/18 13:00  94 20 163/92 (115) 95   


 


5/18/18 12:51  66 22 88/72 (77) 98   


 


5/18/18 12:36 98.0 96 19 138/80 (99) 94   














I/O      


 


 5/18/18 5/18/18 5/18/18 5/19/18 5/19/18 5/19/18





 07:00 15:00 23:00 07:00 15:00 23:00


 


Intake Total 2334 ml  970 ml 490 ml  


 


Output Total 525 ml  1000 ml 800 ml  


 


Balance 1809 ml  -30 ml -310 ml  


 


      


 


Intake Oral   720 ml 240 ml  


 


IV Total 2334 ml  250 ml 250 ml  


 


Output Urine Total 525 ml  1000 ml 800 ml  


 


# Voids   3   


 


# Bowel Movements 0   0  








Physical Exam


GENERAL: NAD, AAOx3


SKIN: Warm and dry.


HEAD: Atraumatic. Normocephalic. 


EYES: Pupils equal and round. No scleral icterus. No injection or drainage. 


ENT: No nasal bleeding or discharge.  Mucous membranes pink and moist.


NECK: Trachea midline. No JVD. 


CARDIOVASCULAR: Regular rate and rhythm.  


RESPIRATORY: No accessory muscle use. Clear to auscultation. Breath sounds 

equal bilaterally. 


GASTROINTESTINAL: Abdomen soft, non-tender, nondistended. Hepatic and splenic 

margins not palpable. 


MUSCULOSKELETAL: Extremities without clubbing, cyanosis, or edema. No obvious 

deformities. Right femoral no hematoma


NEUROLOGICAL: Awake and alert. No obvious cranial nerve deficits.  Motor 

grossly within normal limits. Five out of 5 muscle strength in the arms and 

legs.  Normal speech.


PSYCHIATRIC: Appropriate mood and affect; insight and judgment normal.


Laboratory





Laboratory Tests








Test


  5/19/18


04:40


 


White Blood Count 9.3 TH/MM3 


 


Red Blood Count 3.61 MIL/MM3 


 


Hemoglobin 11.9 GM/DL 


 


Hematocrit 34.7 % 


 


Mean Corpuscular Volume 96.3 FL 


 


Mean Corpuscular Hemoglobin 32.9 PG 


 


Mean Corpuscular Hemoglobin


Concent 34.2 % 


 


 


Red Cell Distribution Width 13.5 % 


 


Platelet Count 111 TH/MM3 


 


Mean Platelet Volume 10.0 FL 


 


Neutrophils (%) (Auto) 84.8 % 


 


Lymphocytes (%) (Auto) 7.1 % 


 


Monocytes (%) (Auto) 6.3 % 


 


Eosinophils (%) (Auto) 1.1 % 


 


Basophils (%) (Auto) 0.7 % 


 


Neutrophils # (Auto) 7.9 TH/MM3 


 


Lymphocytes # (Auto) 0.7 TH/MM3 


 


Monocytes # (Auto) 0.6 TH/MM3 


 


Eosinophils # (Auto) 0.1 TH/MM3 


 


Basophils # (Auto) 0.1 TH/MM3 


 


CBC Comment DIFF FINAL 


 


Differential Comment  


 


Blood Urea Nitrogen 12 MG/DL 


 


Creatinine 0.79 MG/DL 


 


Random Glucose 116 MG/DL 


 


Total Protein 5.7 GM/DL 


 


Albumin 3.1 GM/DL 


 


Calcium Level 7.9 MG/DL 


 


Phosphorus Level 3.3 MG/DL 


 


Magnesium Level 1.9 MG/DL 


 


Alkaline Phosphatase 62 U/L 


 


Aspartate Amino Transf


(AST/SGOT) 39 U/L 


 


 


Alanine Aminotransferase


(ALT/SGPT) 27 U/L 


 


 


Total Bilirubin 0.9 MG/DL 


 


Sodium Level 143 MEQ/L 


 


Potassium Level 3.9 MEQ/L 


 


Chloride Level 110 MEQ/L 


 


Carbon Dioxide Level 24.1 MEQ/L 


 


Anion Gap 9 MEQ/L 


 


Estimat Glomerular Filtration


Rate 69 ML/MIN 


 











Assessment and Plan


Problem List:  


(1) Wide-complex tachycardia


ICD Codes:  I47.2 - Ventricular tachycardia


Status:  Acute


(2) CAD (coronary artery disease)


ICD Codes:  I25.10 - Atherosclerotic heart disease of native coronary artery 

without angina pectoris


Status:  Chronic


(3) Cardiomyopathy


ICD Codes:  I42.9 - Cardiomyopathy, unspecified


Status:  Chronic


Assessment and Plan


1) Wide complex tachycardia


   Dr. Cordero to see on Monday


   Possible EP study, will await Dr. Cordero evaluation


2) CAD with Hx of CABGx4


   4/4 grafts patent


3) Elevated troponin


   Due to wide complex tachycardia


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





Problem Qualifiers





(1) CAD (coronary artery disease):  


Qualified Codes:  I25.10 - Atherosclerotic heart disease of native coronary 

artery without angina pectoris


(2) Cardiomyopathy:  


Qualified Codes:  I25.5 - Ischemic cardiomyopathy








Nacho Gould DO May 19, 2018 11:09

## 2018-05-20 VITALS
RESPIRATION RATE: 31 BRPM | TEMPERATURE: 98.3 F | DIASTOLIC BLOOD PRESSURE: 71 MMHG | HEART RATE: 76 BPM | OXYGEN SATURATION: 97 % | SYSTOLIC BLOOD PRESSURE: 134 MMHG

## 2018-05-20 VITALS — HEART RATE: 66 BPM

## 2018-05-20 VITALS — HEART RATE: 70 BPM

## 2018-05-20 VITALS
RESPIRATION RATE: 19 BRPM | HEART RATE: 63 BPM | OXYGEN SATURATION: 96 % | DIASTOLIC BLOOD PRESSURE: 73 MMHG | SYSTOLIC BLOOD PRESSURE: 144 MMHG | TEMPERATURE: 98.4 F

## 2018-05-20 VITALS
RESPIRATION RATE: 19 BRPM | SYSTOLIC BLOOD PRESSURE: 156 MMHG | OXYGEN SATURATION: 94 % | TEMPERATURE: 98.3 F | HEART RATE: 87 BPM | DIASTOLIC BLOOD PRESSURE: 85 MMHG

## 2018-05-20 VITALS — HEART RATE: 67 BPM

## 2018-05-20 VITALS
DIASTOLIC BLOOD PRESSURE: 78 MMHG | SYSTOLIC BLOOD PRESSURE: 159 MMHG | RESPIRATION RATE: 22 BRPM | OXYGEN SATURATION: 91 % | HEART RATE: 76 BPM | TEMPERATURE: 98.6 F

## 2018-05-20 VITALS
OXYGEN SATURATION: 96 % | DIASTOLIC BLOOD PRESSURE: 58 MMHG | HEART RATE: 68 BPM | SYSTOLIC BLOOD PRESSURE: 120 MMHG | TEMPERATURE: 98.4 F | RESPIRATION RATE: 25 BRPM

## 2018-05-20 VITALS — HEART RATE: 84 BPM

## 2018-05-20 VITALS
SYSTOLIC BLOOD PRESSURE: 140 MMHG | RESPIRATION RATE: 18 BRPM | TEMPERATURE: 98.5 F | HEART RATE: 67 BPM | OXYGEN SATURATION: 96 % | DIASTOLIC BLOOD PRESSURE: 67 MMHG

## 2018-05-20 VITALS
OXYGEN SATURATION: 96 % | HEART RATE: 71 BPM | SYSTOLIC BLOOD PRESSURE: 138 MMHG | DIASTOLIC BLOOD PRESSURE: 83 MMHG | RESPIRATION RATE: 20 BRPM | TEMPERATURE: 98.1 F

## 2018-05-20 VITALS — HEART RATE: 82 BPM

## 2018-05-20 VITALS — HEART RATE: 71 BPM

## 2018-05-20 LAB
ERYTHROCYTE [DISTWIDTH] IN BLOOD BY AUTOMATED COUNT: 13.8 % (ref 11.6–17.2)
HCT VFR BLD CALC: 35.3 % (ref 35–46)
HGB BLD-MCNC: 12 GM/DL (ref 11.6–15.3)
MCH RBC QN AUTO: 33.2 PG (ref 27–34)
MCHC RBC AUTO-ENTMCNC: 34 % (ref 32–36)
MCV RBC AUTO: 97.8 FL (ref 80–100)
PLATELET # BLD: 107 TH/MM3 (ref 150–450)
PMV BLD AUTO: 9.9 FL (ref 7–11)
RBC # BLD AUTO: 3.61 MIL/MM3 (ref 4–5.3)
WBC # BLD AUTO: 9.2 TH/MM3 (ref 4–11)

## 2018-05-20 RX ADMIN — IPRATROPIUM BROMIDE AND ALBUTEROL SULFATE SCH AMPULE: .5; 3 SOLUTION RESPIRATORY (INHALATION) at 04:36

## 2018-05-20 RX ADMIN — HEPARIN SODIUM SCH UNITS: 10000 INJECTION, SOLUTION INTRAVENOUS; SUBCUTANEOUS at 10:21

## 2018-05-20 RX ADMIN — AMIODARONE HYDROCHLORIDE SCH MLS/HR: 50 INJECTION, SOLUTION INTRAVENOUS at 17:34

## 2018-05-20 RX ADMIN — AMIODARONE HYDROCHLORIDE SCH MLS/HR: 50 INJECTION, SOLUTION INTRAVENOUS at 00:55

## 2018-05-20 RX ADMIN — AMIODARONE HYDROCHLORIDE SCH MLS/HR: 50 INJECTION, SOLUTION INTRAVENOUS at 02:00

## 2018-05-20 RX ADMIN — AMIODARONE HYDROCHLORIDE SCH MLS/HR: 50 INJECTION, SOLUTION INTRAVENOUS at 10:25

## 2018-05-20 RX ADMIN — METOPROLOL SUCCINATE SCH MG: 50 TABLET, FILM COATED, EXTENDED RELEASE ORAL at 10:18

## 2018-05-20 RX ADMIN — AMIODARONE HYDROCHLORIDE SCH MLS/HR: 50 INJECTION, SOLUTION INTRAVENOUS at 02:24

## 2018-05-20 RX ADMIN — HYDROCODONE BITARTRATE AND ACETAMINOPHEN PRN TAB: 5; 325 TABLET ORAL at 22:56

## 2018-05-20 RX ADMIN — HUMAN INSULIN SCH: 100 INJECTION, SOLUTION SUBCUTANEOUS at 16:00

## 2018-05-20 RX ADMIN — FAMOTIDINE SCH MG: 20 TABLET, FILM COATED ORAL at 10:18

## 2018-05-20 RX ADMIN — ENALAPRIL MALEATE SCH MG: 10 TABLET ORAL at 10:23

## 2018-05-20 RX ADMIN — HYDROCODONE BITARTRATE AND ACETAMINOPHEN PRN TAB: 5; 325 TABLET ORAL at 03:47

## 2018-05-20 RX ADMIN — IPRATROPIUM BROMIDE AND ALBUTEROL SULFATE SCH AMPULE: .5; 3 SOLUTION RESPIRATORY (INHALATION) at 17:01

## 2018-05-20 RX ADMIN — CHLORHEXIDINE GLUCONATE SCH PACK: 500 CLOTH TOPICAL at 03:48

## 2018-05-20 RX ADMIN — PHENYTOIN SODIUM SCH MLS/HR: 50 INJECTION INTRAMUSCULAR; INTRAVENOUS at 10:25

## 2018-05-20 RX ADMIN — FAMOTIDINE SCH MG: 20 TABLET, FILM COATED ORAL at 20:52

## 2018-05-20 RX ADMIN — HEPARIN SODIUM SCH UNITS: 10000 INJECTION, SOLUTION INTRAVENOUS; SUBCUTANEOUS at 20:53

## 2018-05-20 RX ADMIN — HUMAN INSULIN SCH: 100 INJECTION, SOLUTION SUBCUTANEOUS at 10:00

## 2018-05-20 RX ADMIN — IPRATROPIUM BROMIDE AND ALBUTEROL SULFATE SCH AMPULE: .5; 3 SOLUTION RESPIRATORY (INHALATION) at 20:32

## 2018-05-20 RX ADMIN — HUMAN INSULIN SCH: 100 INJECTION, SOLUTION SUBCUTANEOUS at 03:48

## 2018-05-20 RX ADMIN — HUMAN INSULIN SCH: 100 INJECTION, SOLUTION SUBCUTANEOUS at 22:00

## 2018-05-20 RX ADMIN — IPRATROPIUM BROMIDE AND ALBUTEROL SULFATE SCH AMPULE: .5; 3 SOLUTION RESPIRATORY (INHALATION) at 10:32

## 2018-05-20 RX ADMIN — ASPIRIN SCH MG: 325 TABLET, DELAYED RELEASE ORAL at 10:18

## 2018-05-20 NOTE — EKG
Date Performed: 05/20/2018       Time Performed: 12:55:03

 

PTAGE:      84 years

 

EKG:      Sinus rhythm 

 

 POSSIBLE ANTERIOR MYOCARDIAL INFARCTION , OF INDETERMINATE AGE INFERIOR MYOCARDIAL INFARCTION , PROB
ABLY OLD DIFFUSE NONSPECIFIC T WAVE ABNORMALITY ABNORMAL ECG

 

PREVIOUS TRACING       : 05/17/2018 13.23 Compared to previous tracing, sinus rhythm has replaced wid
e complex tachycardia, right bundle branch block pattern is no longer evident.

 

DOCTOR:   Edin Dickerson  Interpretating Date/Time  05/20/2018 15:11:37

## 2018-05-20 NOTE — HHI.CCPN
Subjective


Remarks/Hospital Course


Patient is an 84-year-old female with past medical history of  coronary artery 

disease, previous MI in 1986, status post CABG in  2001, hypertension, 

hyperlipidemia and lupus who presented to [facility] ED from Dr. Stokes's office after she was found to  be in a wide complex tachycardia 

during an echocardiogram.  The  patient denies any palpitations, lightheadedness

, shortness of breath, chest pain, syncope; however, she reports chronic edema 

of the left  lower extremity.  EKG showed wide complex tachycardia and the 

patient  was given adenosine 6 mg and 12 mg bolus without any effect on her  

heart rate.  She was subsequently given amiodarone bolus which was  repeated 

and placed on amiodarone drip.  The patient was seen by Dr. Dickerson from 

Cardiology service.  Her laboratory data showed elevated  troponin at 1.84.  A 

chest x-ray in the ED showed compensated  cardiomegaly, atelectatic changes 

left hemidiaphragm and laterally in  the left perihilar distribution.  The 

patient denies any fever, chills or any constitutional symptoms.  She denies 

any similar presentation  in the past.





5/18 Patient is awake and alert on Amio and Heparin drips. For cardiac cath 

today. Troponin increased to 6.59 from 1.84 on arrival. Denies any CP or SOB.





5/19 Having some intermittent hallucinations. Cardiology planning for possible 

EP study Monday. On amiodarone drip. Tachypneic with increased WOB. Sounds wet. 





Subjective:


5/20 Breathing much better this morning after diuresis. I/O reflect -800 but 

was more than that due to additional unrecorded output around [place].  


In sinus rhythm. She denies complaint but is anxious to get her evaluation 

complete.





Objective





Vital Signs








  Date Time  Temp Pulse Resp B/P (MAP) Pulse Ox O2 Delivery O2 Flow Rate FiO2


 


5/20/18 12:00  76      


 


5/20/18 12:00 98.3  31 134/71 (92) 97   


 


5/17/18 15:04      Room Air  














Intake and Output   


 


 5/20/18 5/20/18 5/21/18





 08:00 16:00 00:00


 


Intake Total 490 ml  


 


Output Total 1700 ml  


 


Balance -1210 ml  








Result Diagram:  


5/20/18 1055                                                                   

             5/19/18 0440





Imaging





Last Impressions








Chest X-Ray 5/17/18 0000 Signed





Impressions: 





 Service Date/Time:  Thursday, May 17, 2018 15:53 - CONCLUSION:  1. Compensated 





 cardiomegaly. 2. Atelectatic changes above the left hemidiaphragm and 

laterally 





 in the left perihilar distribution.     Rashaun Esteban MD 








Objective Remarks


GENERAL: Patient is 85 yo sitting up in [facility] bed, talking on cellphone. 


SKIN: Warm and dry.


HEAD: Normocephalic.


EYES: No scleral icterus. No injection or drainage. 


NECK: Supple, trachea midline. No JVD or lymphadenopathy.


CARDIOVASCULAR: Regular rate and rhythm without murmurs, gallops, or rubs. 


RESPIRATORY: Breathing much more comfortably, no accessory muscle use. Coughing

, productive of clear sputum. Slight wheeze and bibasilar rale 


GASTROINTESTINAL: Abdomen soft, non-tender, nondistended. 


MUSCULOSKELETAL: No cyanosis, trace edema


Neuro: Awake and alert. Oriented x4 and conversant.  Speaking with her daughter 

on the cell phone intermittently.  Moving all extremities without focal deficit.








A/P


Assessment and Plan


1.  Wide complex tachycardia.


2.  Elevated troponin


3.  History of CAD and CABG


4.  Hypertension.


5.  Hyperlipidemia.


6.  History of lupus.


 


Plan





Neuro: Monitor neuro status and avoid any sedatives. Hallucinations seem 

better. If they are an issue, would consider addition of seroquel. 





Pulm: Oxygen p.r.n. to maintain sats > 92%.


duoneb q6 hours and albuterol q2 prn. 


CXR from 5/19 with mild pulm edema. Resp status improved after lasix 20 mg IV. 

Still with some basilar rales, will give additional Lasix 20 mg IV now. 





CV: 


Wide-complex tachycardia


Inferobasal aneurysm


Monitor HR and BP maintain MAP> 65 mmHg.


      Continue aspirin 325 mg daily, 


Conntinue Amiodarone drip per d/w cardiology. 


        Cardiac catheterization per Dr. Dickerson showed patent grafts


Dr. Cordero consulted for possible EP Study on Monday. 








FEN: Monitor renal function, I's and O's and electrolyte replacement per 

protocol.








GI: on Pepcid for Gl prophylaxis.





ID: Monitor for signs of infection( fever and WBC). 





Heme: Monitor CBC and coags- Off heparin drip. 





Endo: SSI to maintain Euglycemia





GI prophylaxis with Pepcid and DVT prophylaxis with SCD's /heparin subcut. 





Discussed with Dr. Gould.  Transfer patient to see [facility] and consult 

hospitalist.  Patient was updated





Level 2











Giselle Arizmendi MD May 20, 2018 13:37

## 2018-05-20 NOTE — PD.CARD.PN
Subjective


Subjective Remarks


No events overnight





Feels tired overall


   No chest pain/SOB





No arrhythmias noted





Objective


Medications





Current Medications








 Medications


  (Trade)  Dose


 Ordered  Sig/Ag


 Route  Start Time


 Stop Time Status Last Admin


 


  (NS Flush)  2 ml  UNSCH  PRN


 IVF  5/17/18 13:30


     


 


 


 Amiodarone HCl


 450 mg/Sodium


 Chloride  250 ml @ 


 33 mls/hr  [id number]


 IV  5/17/18 13:44


    5/20/18 10:25


 


 


  (Ecotrin Ec)  325 mg  DAILY


 PO  5/18/18 09:00


    5/20/18 10:18


 


 


  (Benadryl)  50 mg  ON  CALL


 PO  5/17/18 15:00


 5/21/18 14:59   


 


 


  (Valium)  10 mg  ON  CALL


 PO  5/17/18 15:00


 5/21/18 14:59   


 


 


  (Versed Inj)  1 mg  ON  CALL


 IV PUSH  5/17/18 15:00


 5/21/18 14:59   


 


 


  (Pepcid)  20 mg  Q12HR


 PO  5/17/18 21:00


    5/20/18 10:18


 


 


  (Duoneb Neb)  1 ampule  Q6HR  NEB


 INH  5/17/18 16:00


    5/20/18 10:32


 


 


  (Duoneb Neb)  1 ampule  Q2HR NEB  PRN


 INH  5/17/18 15:45


     


 


 


  (Misc Nursing


 Information)  1  Q361D


 XX  5/17/18 15:45


     


 


 


  (Chlorhexidine


 2% Cloth)  3 pack


 Taper  DAILY@04


 TOP  5/18/18 04:00


 5/14/19 03:59  5/20/18 03:48


 


 


  (Chlorhexidine


 2% Cloth)  3 pack  UNSCH  PRN


 TOP  5/17/18 15:45


     


 


 


 Sodium Chloride  1,000 ml @ 


 75 mls/hr  [id number]


 IV  5/17/18 15:45


    5/20/18 10:25


 


 


  (D50w (Vial) Inj)  50 ml  UNSCH  PRN


 IV PUSH  5/17/18 16:00


     


 


 


  (Glucagon Inj)  1 mg  UNSCH  PRN


 OTHER  5/17/18 16:00


     


 


 


  (NovoLIN R


 SUPPLEMENTAL


 SCALE)  1  Q6H


 SQ  5/17/18 16:00


    5/19/18 21:29


 


 


  (Restoril)  7.5 mg  HS  PRN


 PO  5/17/18 20:45


    5/18/18 21:09


 


 


  (Tylenol)  650 mg  Q6H  PRN


 PO  5/17/18 23:30


     


 


 


  (Norco  5-325 Mg)  1 tab  Q4H  PRN


 PO  5/17/18 23:30


    5/20/18 03:47


 


 


  (Morphine Inj)  2 mg  Q2H  PRN


 IV PUSH  5/17/18 23:30


     


 


 


  (Atropine Inj)  0.5 mg  UNSCH  PRN


 IV  5/18/18 12:45


     


 


 


  (Vasotec)  10 mg  DAILY


 PO  5/18/18 17:24


    5/20/18 10:23


 


 


  (Toprol Xl)  50 mg  DAILY


 PO  5/18/18 17:25


    5/20/18 10:18


 


 


  (Heparin Inj)  5,000 units  Q12HR


 SQ  5/20/18 09:00


    5/20/18 10:21


 








Vital Signs / I&O





Vital Signs








  Date Time  Temp Pulse Resp B/P (MAP) Pulse Ox O2 Delivery O2 Flow Rate FiO2


 


5/20/18 10:25  71  169/72    


 


5/20/18 10:00  70      


 


5/20/18 08:00  67      


 


5/20/18 08:00 98.5 66 18 140/67 (91) 96   


 


5/20/18 06:00  67      


 


5/20/18 06:00  68  125/60    


 


5/20/18 04:00  65      


 


5/20/18 04:00 98.4 63 19 144/73 (96) 96   


 


5/20/18 02:24  66  130/73    


 


5/20/18 02:00  66      


 


5/20/18 00:00  70      


 


5/20/18 00:00 98.1 71 20 138/83 (101) 96   


 


5/19/18 22:00  107      


 


5/19/18 20:00 100.3 104 26 146/89 (108) 98   


 


5/19/18 20:00  104      


 


5/19/18 18:00  102      


 


5/19/18 16:52  67  138/72    


 


5/19/18 16:00 98.9 63 27 138/72 (94) 89   


 


5/19/18 16:00  63      


 


5/19/18 14:00  61      


 


5/19/18 12:20  70  134/71    


 


5/19/18 12:00  66      


 


5/19/18 12:00 98.5 66 17 123/65 (84) 98   














I/O      


 


 5/19/18 5/19/18 5/19/18 5/20/18 5/20/18 5/20/18





 07:00 15:00 23:00 07:00 15:00 23:00


 


Intake Total 490 ml  1350 ml 490 ml  


 


Output Total 800 ml  950 ml 1700 ml  


 


Balance -310 ml  400 ml -1210 ml  


 


      


 


Intake Oral 240 ml  350 ml 240 ml  


 


IV Total 250 ml  1000 ml 250 ml  


 


Output Urine Total 800 ml  950 ml 1700 ml  


 


# Bowel Movements 0  0 0  








Physical Exam


GENERAL: NAD, AAOx3


SKIN: Warm and dry.


HEAD: Atraumatic. Normocephalic. 


EYES: Pupils equal and round. No scleral icterus. No injection or drainage. 


ENT: No nasal bleeding or discharge.  Mucous membranes pink and moist.


NECK: Trachea midline. No JVD. 


CARDIOVASCULAR: Regular rate and rhythm.  


RESPIRATORY: No accessory muscle use. Clear to auscultation. Breath sounds 

equal bilaterally. 


GASTROINTESTINAL: Abdomen soft, non-tender, nondistended. Hepatic and splenic 

margins not palpable. 


MUSCULOSKELETAL: Extremities without clubbing, cyanosis, or edema. No obvious 

deformities. Right femoral no hematoma


NEUROLOGICAL: Awake and alert. No obvious cranial nerve deficits.  Motor 

grossly within normal limits. Five out of 5 muscle strength in the arms and 

legs.  Normal speech.


PSYCHIATRIC: Appropriate mood and affect; insight and judgment normal.


Laboratory





Laboratory Tests








Test


  5/20/18


10:55


 


White Blood Count 9.2 TH/MM3 


 


Red Blood Count 3.61 MIL/MM3 


 


Hemoglobin 12.0 GM/DL 


 


Hematocrit 35.3 % 


 


Mean Corpuscular Volume 97.8 FL 


 


Mean Corpuscular Hemoglobin 33.2 PG 


 


Mean Corpuscular Hemoglobin


Concent 34.0 % 


 


 


Red Cell Distribution Width 13.8 % 


 


Platelet Count 107 TH/MM3 


 


Mean Platelet Volume 9.9 FL 











Assessment and Plan


Problem List:  


(1) Wide-complex tachycardia


ICD Codes:  I47.2 - Ventricular tachycardia


Status:  Acute


(2) CAD (coronary artery disease)


ICD Codes:  I25.10 - Atherosclerotic heart disease of native coronary artery 

without angina pectoris


Status:  Chronic


(3) Cardiomyopathy


ICD Codes:  I42.9 - Cardiomyopathy, unspecified


Status:  Chronic


Assessment and Plan


1) Wide complex tachycardia


   Dr. Cordero to see on Monday


   Possible EP study, will await Dr. Cordero evaluation


2) CAD with Hx of CABGx4


   4/4 grafts patent


3) Elevated troponin


   Due to wide complex tachycardia


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


   Per the patient, this was known before back to when she had her CABG





Problem Qualifiers





(1) CAD (coronary artery disease):  


Qualified Codes:  I25.10 - Atherosclerotic heart disease of native coronary 

artery without angina pectoris


(2) Cardiomyopathy:  


Qualified Codes:  I25.5 - Ischemic cardiomyopathy








Nacho Gould DO May 20, 2018 11:40

## 2018-05-20 NOTE — RADRPT
EXAM DATE/TIME:  05/19/2018 23:39 

 

[facility] COMPARISON:     

CHEST SINGLE AP, May 17, 2018, 15:53.

 

                     

INDICATIONS :     

Shortness of breath.

                     

 

MEDICAL HISTORY :     

Myocardial infarction.  Cardiovascular disease.  Lupus.      

 

SURGICAL HISTORY :     

CABG.   

 

ENCOUNTER:     

Subsequent                                        

 

ACUITY:     

3 days      

 

PAIN SCORE:     

0/10

 

LOCATION:     

Bilateral chest 

 

FINDINGS:     

A single view of the chest demonstrates postoperative CABG. Cardiomegaly. Mild edema pattern with sma
ll bilateral pleural effusions. No pneumothorax.

 

CONCLUSION:     

1. Cardiomegaly with mild edema pattern, basilar atelectasis and small pleural effusions.

 

 

 

 Waqar Henderson MD on May 20, 2018 at 0:01           

Board Certified Radiologist.

 This report was verified electronically.

## 2018-05-21 VITALS
RESPIRATION RATE: 22 BRPM | OXYGEN SATURATION: 95 % | DIASTOLIC BLOOD PRESSURE: 86 MMHG | SYSTOLIC BLOOD PRESSURE: 149 MMHG | HEART RATE: 86 BPM | TEMPERATURE: 98.8 F

## 2018-05-21 VITALS
DIASTOLIC BLOOD PRESSURE: 84 MMHG | SYSTOLIC BLOOD PRESSURE: 154 MMHG | HEART RATE: 73 BPM | RESPIRATION RATE: 16 BRPM | TEMPERATURE: 98.6 F

## 2018-05-21 VITALS
OXYGEN SATURATION: 96 % | HEART RATE: 73 BPM | TEMPERATURE: 97.8 F | DIASTOLIC BLOOD PRESSURE: 88 MMHG | SYSTOLIC BLOOD PRESSURE: 139 MMHG | RESPIRATION RATE: 16 BRPM

## 2018-05-21 VITALS — HEART RATE: 71 BPM

## 2018-05-21 VITALS — HEART RATE: 114 BPM

## 2018-05-21 VITALS — HEART RATE: 80 BPM

## 2018-05-21 VITALS
RESPIRATION RATE: 18 BRPM | DIASTOLIC BLOOD PRESSURE: 84 MMHG | OXYGEN SATURATION: 95 % | HEART RATE: 76 BPM | TEMPERATURE: 98.4 F | SYSTOLIC BLOOD PRESSURE: 137 MMHG

## 2018-05-21 VITALS — HEART RATE: 74 BPM

## 2018-05-21 VITALS — HEART RATE: 73 BPM

## 2018-05-21 VITALS — HEART RATE: 108 BPM

## 2018-05-21 VITALS
DIASTOLIC BLOOD PRESSURE: 85 MMHG | SYSTOLIC BLOOD PRESSURE: 144 MMHG | OXYGEN SATURATION: 95 % | TEMPERATURE: 98.6 F | HEART RATE: 69 BPM | RESPIRATION RATE: 19 BRPM

## 2018-05-21 VITALS — HEART RATE: 94 BPM

## 2018-05-21 VITALS — HEART RATE: 68 BPM

## 2018-05-21 VITALS — HEART RATE: 78 BPM

## 2018-05-21 VITALS
SYSTOLIC BLOOD PRESSURE: 165 MMHG | RESPIRATION RATE: 16 BRPM | HEART RATE: 80 BPM | DIASTOLIC BLOOD PRESSURE: 88 MMHG | TEMPERATURE: 98.6 F

## 2018-05-21 VITALS — HEART RATE: 72 BPM

## 2018-05-21 VITALS — HEART RATE: 77 BPM

## 2018-05-21 VITALS — OXYGEN SATURATION: 95 %

## 2018-05-21 VITALS — HEART RATE: 76 BPM

## 2018-05-21 VITALS — HEART RATE: 90 BPM

## 2018-05-21 VITALS — OXYGEN SATURATION: 92 %

## 2018-05-21 VITALS — OXYGEN SATURATION: 96 %

## 2018-05-21 VITALS — HEART RATE: 81 BPM

## 2018-05-21 LAB
BASOPHILS # BLD AUTO: 0.1 TH/MM3 (ref 0–0.2)
BASOPHILS NFR BLD: 0.7 % (ref 0–2)
BUN SERPL-MCNC: 15 MG/DL (ref 7–18)
CALCIUM SERPL-MCNC: 8.4 MG/DL (ref 8.5–10.1)
CHLORIDE SERPL-SCNC: 107 MEQ/L (ref 98–107)
CREAT SERPL-MCNC: 0.84 MG/DL (ref 0.5–1)
EOSINOPHIL # BLD: 0.2 TH/MM3 (ref 0–0.4)
EOSINOPHIL NFR BLD: 2.2 % (ref 0–4)
ERYTHROCYTE [DISTWIDTH] IN BLOOD BY AUTOMATED COUNT: 13.8 % (ref 11.6–17.2)
GFR SERPLBLD BASED ON 1.73 SQ M-ARVRAT: 65 ML/MIN (ref 89–?)
GLUCOSE SERPL-MCNC: 100 MG/DL (ref 74–106)
HCO3 BLD-SCNC: 27.6 MEQ/L (ref 21–32)
HCT VFR BLD CALC: 33.9 % (ref 35–46)
HGB BLD-MCNC: 11.7 GM/DL (ref 11.6–15.3)
LYMPHOCYTES # BLD AUTO: 0.9 TH/MM3 (ref 1–4.8)
LYMPHOCYTES NFR BLD AUTO: 11.4 % (ref 9–44)
MAGNESIUM SERPL-MCNC: 1.9 MG/DL (ref 1.5–2.5)
MCH RBC QN AUTO: 33.2 PG (ref 27–34)
MCHC RBC AUTO-ENTMCNC: 34.5 % (ref 32–36)
MCV RBC AUTO: 96.1 FL (ref 80–100)
MONOCYTE #: 0.6 TH/MM3 (ref 0–0.9)
MONOCYTES NFR BLD: 7.8 % (ref 0–8)
NEUTROPHILS # BLD AUTO: 6.1 TH/MM3 (ref 1.8–7.7)
NEUTROPHILS NFR BLD AUTO: 77.9 % (ref 16–70)
PLATELET # BLD: 116 TH/MM3 (ref 150–450)
PMV BLD AUTO: 10.1 FL (ref 7–11)
RBC # BLD AUTO: 3.53 MIL/MM3 (ref 4–5.3)
SODIUM SERPL-SCNC: 144 MEQ/L (ref 136–145)
WBC # BLD AUTO: 7.8 TH/MM3 (ref 4–11)

## 2018-05-21 RX ADMIN — HEPARIN SODIUM SCH UNITS: 10000 INJECTION, SOLUTION INTRAVENOUS; SUBCUTANEOUS at 20:57

## 2018-05-21 RX ADMIN — FAMOTIDINE SCH MG: 20 TABLET, FILM COATED ORAL at 09:13

## 2018-05-21 RX ADMIN — HUMAN INSULIN SCH: 100 INJECTION, SOLUTION SUBCUTANEOUS at 04:00

## 2018-05-21 RX ADMIN — IPRATROPIUM BROMIDE AND ALBUTEROL SULFATE SCH AMPULE: .5; 3 SOLUTION RESPIRATORY (INHALATION) at 02:55

## 2018-05-21 RX ADMIN — HUMAN INSULIN SCH: 100 INJECTION, SOLUTION SUBCUTANEOUS at 22:45

## 2018-05-21 RX ADMIN — IPRATROPIUM BROMIDE AND ALBUTEROL SULFATE SCH AMPULE: .5; 3 SOLUTION RESPIRATORY (INHALATION) at 15:40

## 2018-05-21 RX ADMIN — ENALAPRIL MALEATE SCH MG: 10 TABLET ORAL at 09:12

## 2018-05-21 RX ADMIN — FAMOTIDINE SCH MG: 20 TABLET, FILM COATED ORAL at 20:55

## 2018-05-21 RX ADMIN — HEPARIN SODIUM SCH UNITS: 10000 INJECTION, SOLUTION INTRAVENOUS; SUBCUTANEOUS at 09:17

## 2018-05-21 RX ADMIN — METOPROLOL SUCCINATE SCH MG: 50 TABLET, FILM COATED, EXTENDED RELEASE ORAL at 09:11

## 2018-05-21 RX ADMIN — CHLORHEXIDINE GLUCONATE SCH PACK: 500 CLOTH TOPICAL at 04:00

## 2018-05-21 RX ADMIN — METHYLPREDNISOLONE SODIUM SUCCINATE SCH MG: 40 INJECTION, POWDER, FOR SOLUTION INTRAMUSCULAR; INTRAVENOUS at 20:56

## 2018-05-21 RX ADMIN — Medication PRN ML: at 20:59

## 2018-05-21 RX ADMIN — HUMAN INSULIN SCH: 100 INJECTION, SOLUTION SUBCUTANEOUS at 16:00

## 2018-05-21 RX ADMIN — IPRATROPIUM BROMIDE AND ALBUTEROL SULFATE SCH AMPULE: .5; 3 SOLUTION RESPIRATORY (INHALATION) at 09:19

## 2018-05-21 RX ADMIN — ASPIRIN SCH MG: 325 TABLET, DELAYED RELEASE ORAL at 09:11

## 2018-05-21 RX ADMIN — HUMAN INSULIN SCH: 100 INJECTION, SOLUTION SUBCUTANEOUS at 10:35

## 2018-05-21 RX ADMIN — AMIODARONE HYDROCHLORIDE SCH MLS/HR: 50 INJECTION, SOLUTION INTRAVENOUS at 09:13

## 2018-05-21 NOTE — HHI.PR
Subjective


Remarks


Patient seen and examined earlier today


He reported chest tightness and cough no palpitation, she does have wheezing, 

she is status post heart cath by  and plan to go for EP study by Dr. Atkinson for wide-complex tachycardia





Objective


Vitals





Vital Signs








  Date Time  Temp Pulse Resp B/P (MAP) Pulse Ox O2 Delivery O2 Flow Rate FiO2


 


5/21/18 16:00  77      


 


5/21/18 15:08  73      


 


5/21/18 15:08 97.8 73 16 139/88 (105) 96   


 


5/21/18 15:00  74      


 


5/21/18 14:00  73      


 


5/21/18 13:00  74      


 


5/21/18 12:00  72      


 


5/21/18 11:58 98.6 73 16 154/84 (107)    


 


5/21/18 11:00  74      


 


5/21/18 10:00  108      


 


5/21/18 09:19     92 Nasal Cannula 2.00 


 


5/21/18 09:13  98  165/88    


 


5/21/18 09:00  114      


 


5/21/18 08:00  78      


 


5/21/18 07:45 98.6 80 16 165/88 (113)    


 


5/21/18 07:00  71      


 


5/21/18 06:04  71      


 


5/21/18 05:00  68      


 


5/21/18 04:00  94      


 


5/21/18 03:33 98.4 76 18 137/84 (101) 95   


 


5/21/18 02:57     96 Nasal Cannula 2.00 


 


5/21/18 02:00  80      


 


5/21/18 01:00  72      


 


5/21/18 00:22   17     


 


5/21/18 00:11  80      


 


5/20/18 23:03 98.3 87 19 156/85 (108) 94   


 


5/20/18 23:00  82      


 


5/20/18 22:00  84      


 


5/20/18 20:00  76      


 


5/20/18 20:00 98.6 76 22 159/78 (105) 91   


 


5/20/18 20:00  76  159/78    


 


5/20/18 17:34  79  158/78    














I/O      


 


 5/20/18 5/20/18 5/20/18 5/21/18 5/21/18 5/21/18





 07:00 15:00 23:00 07:00 15:00 23:00


 


Intake Total 490 ml     


 


Output Total 1700 ml 1250 ml 500 ml   


 


Balance -1210 ml -1250 ml -500 ml   


 


      


 


Intake Oral 240 ml     


 


IV Total 250 ml     


 


Output Urine Total 1700 ml 1250 ml 500 ml   


 


# Bowel Movements 0     








Result Diagram:  


5/21/18 0520 5/21/18 0520





Objective Remarks


GENERAL: This is a well-nourished, well-developed patient, in no apparent 

distress.


CARDIOVASCULAR: RRR,  no gallops, or rubs. 


RESPIRATORY: Fair air entry bilaterally, minimal wheezing


GASTROINTESTINAL: Abdomen soft, non-tender, nondistended.  Positive bowel sounds


MUSCULOSKELETAL: Extremities without clubbing, cyanosis, or edema.  Pedal 

pulses appreciated


NEUROLOGICAL: Awake and alert.  Moves all extremity.  Normal speech.no focal 

neurological deficit





A/P


Assessment and Plan


Patient is an 84-year-old female with past medical history of  coronary artery 

disease, previous MI in 1986, status post CABG in  2001, hypertension, 

hyperlipidemia and lupus who presented to [facility] ED from Dr. Stokes's office after she was found to  be in a wide complex tachycardia 

during an echocardiogram.  The  patient denies any palpitations, lightheadedness

, shortness of breath, chest pain, syncope; however, she reports chronic edema 

of the left  lower extremity.  EKG showed wide complex tachycardia and the 

patient  was given adenosine 6 mg and 12 mg bolus without any effect on her  

heart rate.  She was subsequently given amiodarone bolus which was  repeated 

and placed on amiodarone drip.  The patient was seen by Dr. Dickerson from 

Cardiology service.  Her laboratory data showed elevated  troponin at 1.84.  A 

chest x-ray in the ED showed compensated  cardiomegaly, atelectatic changes 

left hemidiaphragm and laterally in  the left perihilar distribution.  The 

patient denies any fever, chills or any constitutional symptoms.  She denies 

any similar presentation  in the past.





5/18 Patient is awake and alert on Amio and Heparin drips. For cardiac cath 

today. Troponin increased to 6.59 from 1.84 on arrival. Denies any CP or SOB.





5/19 Having some intermittent hallucinations. Cardiology planning for possible 

EP study Monday. On amiodarone drip. Tachypneic with increased WOB. Sounds wet. 





5/20 Breathing much better this morning after diuresis. I/O reflect -800 but 

was more than that due to additional unrecorded output around [place].  


In sinus rhythm. She denies complaint but is anxious to get her evaluation 

complete.





5/21: Patient going for EP study to be decided and scheduled by Dr. Atkinson, 

blood pressure still not optimized I added low-dose Norvasc to be adjusted, 

patient status post heart cath, positive minimal wheezing will continue DuoNeb 

and low-dose Solu-Medrol








A/P:


1.  Wide complex tachycardia.


2.  Elevated troponin


3.  History of CAD and CABG


4.  Hypertension.


5.  Hyperlipidemia.


6.  History of lupus.


 


Plan





Monitor neuro status and avoid any sedatives. Hallucinations seem better. If 

they are an issue, would consider addition of seroquel. 


Pulm: Oxygen p.r.n. to maintain sats > 92%.


duoneb q6 hours and albuterol q2 prn. 


CXR from 5/19 with mild pulm edema. Resp status improved after lasix 20 mg IV. 

Still with some basilar rales, will give additional Lasix 20 mg IV now. 








Wide-complex tachycardia


Inferobasal aneurysm


Monitor HR and BP maintain MAP> 65 mmHg.


      Continue aspirin 325 mg daily, 


Conntinue Amiodarone drip per d/w cardiology. 


        Cardiac catheterization per Dr. Dickerson showed patent grafts


Dr. Cordero consulted for  EP Study to be scheduled








Monitor renal function, I's and O's and electrolyte replacement per protocol.





 Monitor CBC and coags- Off heparin drip. 





 SSI to maintain Euglycemia





GI prophylaxis with Pepcid and DVT prophylaxis with SCD's /heparin subcut.


Discharge Planning


Patient going for EP study for wide-complex tachycardia











Arianne Masters MD May 21, 2018 17:11

## 2018-05-22 VITALS
RESPIRATION RATE: 18 BRPM | OXYGEN SATURATION: 94 % | HEART RATE: 95 BPM | SYSTOLIC BLOOD PRESSURE: 125 MMHG | TEMPERATURE: 98.1 F | DIASTOLIC BLOOD PRESSURE: 82 MMHG

## 2018-05-22 VITALS — HEART RATE: 72 BPM

## 2018-05-22 VITALS
TEMPERATURE: 98.2 F | OXYGEN SATURATION: 95 % | HEART RATE: 72 BPM | RESPIRATION RATE: 19 BRPM | DIASTOLIC BLOOD PRESSURE: 87 MMHG | SYSTOLIC BLOOD PRESSURE: 143 MMHG

## 2018-05-22 VITALS — HEART RATE: 70 BPM

## 2018-05-22 VITALS
SYSTOLIC BLOOD PRESSURE: 143 MMHG | TEMPERATURE: 97.9 F | HEART RATE: 75 BPM | DIASTOLIC BLOOD PRESSURE: 82 MMHG | RESPIRATION RATE: 18 BRPM | OXYGEN SATURATION: 94 %

## 2018-05-22 VITALS
HEART RATE: 72 BPM | SYSTOLIC BLOOD PRESSURE: 158 MMHG | RESPIRATION RATE: 18 BRPM | OXYGEN SATURATION: 95 % | TEMPERATURE: 97.7 F | DIASTOLIC BLOOD PRESSURE: 89 MMHG

## 2018-05-22 VITALS — HEART RATE: 100 BPM

## 2018-05-22 VITALS
HEART RATE: 66 BPM | RESPIRATION RATE: 18 BRPM | DIASTOLIC BLOOD PRESSURE: 80 MMHG | OXYGEN SATURATION: 95 % | SYSTOLIC BLOOD PRESSURE: 131 MMHG | TEMPERATURE: 97.7 F

## 2018-05-22 VITALS — HEART RATE: 64 BPM

## 2018-05-22 VITALS — HEART RATE: 68 BPM

## 2018-05-22 VITALS — HEART RATE: 66 BPM

## 2018-05-22 VITALS — HEART RATE: 61 BPM

## 2018-05-22 VITALS — HEART RATE: 101 BPM

## 2018-05-22 VITALS — HEART RATE: 76 BPM

## 2018-05-22 VITALS — HEART RATE: 108 BPM

## 2018-05-22 VITALS — HEART RATE: 102 BPM

## 2018-05-22 VITALS — HEART RATE: 80 BPM

## 2018-05-22 VITALS — HEART RATE: 79 BPM

## 2018-05-22 VITALS — HEART RATE: 75 BPM

## 2018-05-22 LAB
BUN SERPL-MCNC: 16 MG/DL (ref 7–18)
CALCIUM SERPL-MCNC: 8.9 MG/DL (ref 8.5–10.1)
CHLORIDE SERPL-SCNC: 106 MEQ/L (ref 98–107)
CREAT SERPL-MCNC: 0.75 MG/DL (ref 0.5–1)
GFR SERPLBLD BASED ON 1.73 SQ M-ARVRAT: 74 ML/MIN (ref 89–?)
GLUCOSE SERPL-MCNC: 129 MG/DL (ref 74–106)
HCO3 BLD-SCNC: 24.6 MEQ/L (ref 21–32)
SODIUM SERPL-SCNC: 142 MEQ/L (ref 136–145)

## 2018-05-22 PROCEDURE — 0JH608Z INSERTION OF DEFIBRILLATOR GENERATOR INTO CHEST SUBCUTANEOUS TISSUE AND FASCIA, OPEN APPROACH: ICD-10-PCS

## 2018-05-22 PROCEDURE — 02HK3KZ INSERTION OF DEFIBRILLATOR LEAD INTO RIGHT VENTRICLE, PERCUTANEOUS APPROACH: ICD-10-PCS

## 2018-05-22 RX ADMIN — FAMOTIDINE SCH MG: 20 TABLET, FILM COATED ORAL at 09:06

## 2018-05-22 RX ADMIN — CHLORHEXIDINE GLUCONATE SCH PACK: 500 CLOTH TOPICAL at 04:00

## 2018-05-22 RX ADMIN — FAMOTIDINE SCH MG: 20 TABLET, FILM COATED ORAL at 20:54

## 2018-05-22 RX ADMIN — PHENYTOIN SODIUM SCH MLS/HR: 50 INJECTION INTRAMUSCULAR; INTRAVENOUS at 14:00

## 2018-05-22 RX ADMIN — PHENYTOIN SODIUM SCH MLS/HR: 50 INJECTION INTRAMUSCULAR; INTRAVENOUS at 21:05

## 2018-05-22 RX ADMIN — METOPROLOL SUCCINATE SCH MG: 50 TABLET, FILM COATED, EXTENDED RELEASE ORAL at 09:06

## 2018-05-22 RX ADMIN — Medication PRN ML: at 20:55

## 2018-05-22 RX ADMIN — ENALAPRIL MALEATE SCH MG: 10 TABLET ORAL at 09:07

## 2018-05-22 RX ADMIN — ASPIRIN SCH MG: 325 TABLET, DELAYED RELEASE ORAL at 09:07

## 2018-05-22 RX ADMIN — HUMAN INSULIN SCH: 100 INJECTION, SOLUTION SUBCUTANEOUS at 20:55

## 2018-05-22 RX ADMIN — HUMAN INSULIN SCH: 100 INJECTION, SOLUTION SUBCUTANEOUS at 16:00

## 2018-05-22 RX ADMIN — METHYLPREDNISOLONE SODIUM SUCCINATE SCH MG: 40 INJECTION, POWDER, FOR SOLUTION INTRAMUSCULAR; INTRAVENOUS at 09:08

## 2018-05-22 RX ADMIN — HUMAN INSULIN SCH: 100 INJECTION, SOLUTION SUBCUTANEOUS at 10:00

## 2018-05-22 RX ADMIN — CHLORHEXIDINE GLUCONATE SCH PACK: 500 CLOTH TOPICAL at 21:09

## 2018-05-22 RX ADMIN — PRAVASTATIN SODIUM SCH MG: 20 TABLET ORAL at 20:54

## 2018-05-22 RX ADMIN — HYDROCODONE BITARTRATE AND ACETAMINOPHEN PRN TAB: 5; 325 TABLET ORAL at 21:15

## 2018-05-22 RX ADMIN — HUMAN INSULIN SCH: 100 INJECTION, SOLUTION SUBCUTANEOUS at 04:00

## 2018-05-22 RX ADMIN — HEPARIN SODIUM SCH UNITS: 10000 INJECTION, SOLUTION INTRAVENOUS; SUBCUTANEOUS at 20:55

## 2018-05-22 RX ADMIN — METHYLPREDNISOLONE SODIUM SUCCINATE SCH MG: 40 INJECTION, POWDER, FOR SOLUTION INTRAMUSCULAR; INTRAVENOUS at 20:54

## 2018-05-22 NOTE — MB
cc:

Trisha Cordero MD,Edin JEROME MD

****

 

 

DATE:

05/21/2018

 

REASON FOR CONSULTATION:

Wide complex tachyarrhythmia, low ejection fraction.

 

HISTORY OF PRESENT ILLNESS:

Mrs. Deutsch is an 84-year-old  female with history of 

coronary artery disease, coronary artery bypass grafting, ejection 

fraction by _____, was sent to the emergency room due to a wide 

complex tachyarrhythmia.  During hospitalization, left heart 

catheterization was performed by Dr. Dickerson, found patent graft.  No 

occlusion or significant occlusion.  The patient was put on 

amiodarone.  I was consulted for evaluation and management.  The chart

was reviewed.  The patient was evaluated.

 

ALLERGIES:

NONE REPORTED.

 

SOCIAL HISTORY:

Negative for smoking and drinking.

 

FAMILY HISTORY:

Noncontributory to her current medical condition.

 

MEDICATIONS:

She is on methylprednisolone, Norvasc 2.5 mg a day, albuterol inhaler,

Toprol-XL 50 mg a day, enalapril 10 mg a day, aspirin, amiodarone.

 

REVIEW OF SYSTEMS:

The patient is feeling better.  No chest pain or chest discomfort.  No

fever.

 

PHYSICAL EXAMINATION:

GENERAL:  Alert, fully oriented.

VITAL SIGNS:  Blood pressure on evaluation 129/88, pulse 72, 

respiratory rate 18.

LUNGS:  ____.

CARDIOVASCULAR:  S1, S2.  No gallop.  No murmur.

ABDOMEN:  Soft.  No mass, no bruits.

EXTREMITIES:  No edema.

 

ELECTROCARDIOGRAM:

During hospitalization shows wide complex tachyarrhythmia, with 

superior axis and right bundle branch block.  Subsequent 

electrocardiogram shows sinus rhythm, diffuse ST changes.

 

LABORATORY DATA:

Hemoglobin 11.7, white blood cell 7.8.  Potassium on hospitalization 

was 3.8.  Troponin 6.59.  Creatinine 0.84.

 

ASSESSMENT AND RECOMMENDATIONS:

Mrs. Deutsch was admitted due to shortness of breath and palpitation.  

This is a superior axis and right bundle branch block.  This is a 

completely different axis than the baseline echocardiogram.  This is 

ventricular tachycardia and the patient was symptomatic.  She was on 

amiodarone, converted into sinus rhythm back.  Troponin elevated.  

Troponin increase may be due to increased myocardial demand because of

ventricular tachycardia.  I had a long conversation with Mrs. Deutsch. 

I am going to discontinue the amiodarone.  The best approach at this 

point is a defibrillator implantation for sudden death secondary 

prevention.  _____ the patient is scheduled by Dr. Dickerson for the 

procedure.  I agree with the decision.  She will need a defibrillator 

for sudden death secondary prevention.  The risks, the nature and the 

benefits of the procedure are clearly stated to her.  She had a 

conversation with Dr. Dickerson.  I will be available on a p.r.n. basis.

 

 

__________________________________

MD CARA Neri/RON/manolo

D: 05/21/2018, 11:32 PM

T: 05/21/2018, 11:57 PM

Visit #: [id number]

Job #: [id number]

## 2018-05-22 NOTE — MP
cc:

Edin Dickerson MD, Alan S MD

****

 

 

DATE OF OPERATION:

05/22/2018

 

PROCEDURE PERFORMED:

Single chamber (with atrial sensing capability) AICD implantation via 

the left subclavian vein.

 

INDICATIONS FOR PROCEDURE:

Secondary prevention of sudden cardiac death.

 

OPERATIVE NOTE:

The patient was brought to the operating suite in a fasting state 

after having signed informed consent.  The left upper chest was 

prepped and draped as per policy and anesthetized with 1% lidocaine.  

A transverse incision was made inferior to the left clavicle and using

blunt dissection, a subcutaneous pocket was formed down to the 

pectoralis fascia.  Using modified Seldinger technique, central venous

access was obtained via the left subclavian vein, after administration

of contrast through a left arm peripheral IV.  An 8-Cambodian sheath was 

placed and through this sheath, a ventricular active fixation lead was

introduced and its tip positioned in the right ventricular apex, where

good current of injury, stimulation threshold (0.8 volts) and 

sensitivity (9.4 millivolts) were demonstrated.  Atrial sensing was 

also good at 7.2 millivolts.  The lead was secured into place using 

2-0 silk ties down to the pectoralis fascia.  The lead was connected 

to the AICD generator, which is a Biotronik Intica device.  The lead 

and the generator were placed into the subcutaneous pocket, which was 

closed using 3-0 Vicryl interrupted stitches in 2 layers to close the 

subcutaneous tissue and then 4-0 Monocryl running stitch to close the 

subcuticular tissue.  Overlapping Steri-Strips and a dressing were 

applied.  There were no apparent immediate complications.  A portable 

chest x-ray is pending at the time of this dictation.

 

CONCLUSIONS:

Successful single chamber (with atrial sensing capability), AICD 

implantation via the left subclavian vein using a Biotronik Intica 

AICD generator.

 

 

__________________________________

MD PERLITA Adkins/RON

D: 05/22/2018, 05:58 PM

T: 05/22/2018, 06:32 PM

Visit #: [id number]

Job #: [id number]

[facility]

## 2018-05-22 NOTE — RADRPT
EXAM DATE:  5/22/2018 6:46 PM EDT

AGE/SEX:        84 years / Female



INDICATIONS:  Evaluate for pneumothorax. Status post pacemaker placement.



CLINICAL DATA:  This is the patient's initial encounter. Patient reports that signs and symptoms have
 been present for 1 day and indicates a pain score of Nonresponsive. 

                                                                          

MEDICAL/SURGICAL HISTORY:       Non-responsive. . CABG.



COMPARISON:      [facility] [place], CHEST SINGLE AP, 5/19/2018.  .



FINDINGS:  The patient is status post sternotomy. There is a pacing/AICD device in place from the lef
t subclavian approach. There does appear to be a small apical left pneumothorax measuring 0.6 cm in t
hickness. The cardiac silhouette is enlarged. There is increased density seen throughout much of the 
left lung being worse at the left base. There is mild increased density at the right base.



CONCLUSION: 

1.  Pacer device in place from the left subclavian approach with a small left apical pneumothorax.

2.  Cardiomegaly.

3.  Increased density seen bilaterally being worse on the left likely related to a combination of ate
lectasis and/or consolidation.



Electronically signed by: Mahesh Waite MD  5/22/2018 6:53 PM EDT

## 2018-05-22 NOTE — CATHPROC
[employer] HIS Report

Study Information

Study Number    Admission           Scheduled Start              Study Start

 

[id number]    May 17 2018 3:42PM      05/22/2018                May 22 2018 4:24PM

 

Universal Service

 

Cardiac Pacer/ICD

 

Admit Source               Facility Department

 

Other                   [facility] - Cath Lab

 

Physician and Clinical Staff

Initial Edin Norton           Circulator      [facility],RT(R) TECH2

 

                         Circulator      Karen Marcial,RN

 

                         Other        Anesthesia, CRNA

 

                         Recorder       Ashli Samayoa BSN

 

                         Scrub        Dustin Pepe,RT(R)

 

Procedures Performed

Procedure                     Location (Site)             Vessel Name

 

Lead Insertion

Venogram                     Subclav. Vein (Lft           Subclavian Vein

Equipment

Time            Description           Size       Mfg Part Number  Used/Scraped

                   DEFIBRILLATOR, INTICA 7 VR-T

17:35    BIOTRONIK                         VVE-VDDR     [id number]      Used

                   DX

17:26    [employer]        LEAD, PLEXA PRO-MRI DF 65/15           [id number]      Used

                   WIRE, HYDROSTEER 150CM              [id number]

17:25    [employer]                   150CM               Used

                   ANGLED GLIDE                   *[id number]

                                            [id number]

17:31    [employer]    SUTURE, STRIP PLUS 1/2"     *                 Used

                                            *[id number]

17:31    [employer] PACER      ADHESIVE, MASTISOL 2/3CC     2/3CC      0523-48      Used

 

17:31    [employer] PACER      MCDONALD, LIMB           *        2530 *[id number]   Used

                                            [id number]

17:31    [employer] PAC[employer]      PACK, PACER CUSTOM        *                 Used

                                            *[id number]

                                            [id number]

17:31    [employer] PAC[employer]      PEN, SKIN DUAL W/ RULER     *                 Used

                                            *[id number]

17:22    [facility] PACER   SAFE SHEATH, FR8, 13CM      FR 8       [id number]     Used

 

16:40    Needle Sponge Count   2                         22        Used

 

16:39    Needle Sponge Count   25                        1         Used

 

16:39    Needle Sponge Count   3                         3         Used

 

17:13    [employer]         OMNIPAQUE, 350 MG, 50ML     50ML       [id number]      Used

 



                                            [id number]



                                            *[id number]

                   SUTURE, 3-0 VICRYL [SH]



                   (GXT195H)

                   SUTURE, 3-0 VICRYL [SH]



                   (TAB091V)

                   SUTURE, 4-0 MONOCRYL [PS2]



                   (Y496G)

                                            [id number]

17:31    [employer]      BLANKET,WARM AIR CCL       *                 Used

                                            *[id number]

       [facility]      PAD, ELECTROSURGICAL

17:31                                 *         *[id number] Used

       SURGICAL         GROUNDING ORANGE

                                            [id number]

17:31    [employer] Z[employer]HR[employer].    /                *                 Used

                                            *[id number]

 

Equipment Model, Serial, Lot Number and Expiration Data

Description              Model Number      Serial Number    Lot Number       Expiration Date

 

DEFIBRILLATOR, INTICA 7 VR-T DX [id number]            [id number]                   07-

 

LEAD, PLEXA PRO-MRI DF 65/15     172615         [id number]                   02-

WIRE, HYDROSTEER 150CM

                                         [id number]         09-

ANGLED GLIDE

 

History: Current Medications

Medication         Dosage/Unit       Route      Frequency  Last Date/Time Taken

 

NORVASC

 

PREDNISONE

 

Albuterol

 

ASA

History: Allergies

Allergy               Reaction

 

No Known Drug Allergies

 

 

History: Risk Factors

                      Family History of

Hypertension   Dyslipidemia                    Previous MI     Previous Heart Failure

                      Premature CAD

Yes        Yes           No            No         No

Prior Valve

         Prior PCI        Prior CABG     Prior CABGDate

Surgery

No        No            Yes         01/01/1986

         Cerebrovascular     Peripheral Artery     Chronic Lung

On Dialysis                                       Diabetes

         Disease         Disease          Disease

No        No            No            No         No

 

 

History: Symptoms/Diagnosis Selection Items

Palpitations

 

SOB

 

 

History: Stress Tests

Stress or Imaging Studies Performed

 

No

 

 

Labs

Hgb (g/dl)       Hct (%)         RBC (MIL/MM3)       WBC (l/cumm)       Platelets (thousands)

 

11.60-17.00      35.00-51.00       4.00-5.90         4.00-11.00        150..00

 

11.7          33.9           3.5            7.8            116

 

Glucose (mg/dl)    BUN (mg/dl)       Creatinine (mg/dl)    BUN:Creatinine (1:x)

74..00      7.00-18.00        0.50-1.30         10.00-20.00

 

129          16            0.7            22.9

 

Na (meq/l)       K (meq/l)        Cl (meq/l)        CO2 (mmol/L)       Ca (mg/dl)

 

136..00     3.50-5.10        98..00       21.00-32.00        8.50-10.10

 

142          4.2           106            24.6           8.9

 

PT (sec)        PTT (sec)         INR (PTT:PT)

 

9.80-11.60       24.30-30.10        0.90-1.10

 

10.2          51.6            1

 

 

 

 

Medication

Medication Total Dose (Bolus/Oral)

Medication             Total Dosage/Unit

 

2% XYLOCAINE               20 mL

Medications (Bolus/Oral)

Medication          Time Given          Dosage/Unit      Administered By      Reason

 

2% XYLOCAINE         5/22/2018 5:12:52 PM      20 mL         Edin Dickerson

20 mL 2% XYLOCAINE given in lab by Edin Dickerson in Left Upper Chest via Subcutaneous. Ordered by Edin Edwards.

 

Medication (Drip)

Medication          Time Given          Dosage/Unit      Concentration/Unit  Diluent (ml)  Solution

 

ANCEF             5/22/2018 4:45:27 PM      2 g

2 g ANCEF given in lab by Anesthesia, CRNA via Peripheral IV. Ordered by Edin Dickerson. Reason: As per
 physicians verbal order.

 

IV Solutions         5/22/2018 4:26:05 PM      0 mL (IV)                 500       NaCl .9

Patient arrived on IV Solutions given by Edin Dickerson in Left Arm via Peripheral IV. Pump/Drip Flow =
 20 ml/hr using NaCl .9. Ordered by Edin Dickerson.

VANCOMYCIN DRIP        5/22/2018 4:45:33 PM      1 g

1 g VANCOMYCIN DRIP given in lab by Carolyn, CRNA via Peripheral IV. Ordered by Edin Dickerson. Reas
on: As per physicians verbal order.

Initial Case Assessment

Cardiovascular

HR           Rhythm         NIBP          Chest Pain

 

75           SR           158/92         0

 

Edema Present      Skin color           Skin

 

None           Normal             Warm Dry

 

Circulatory - Right Pulses

Dorsalis Pedis

 

1

 

Scale (0,1,2,3,4,d)

 

Circulatory - Left Pulses

Dorsalis Pedis

 

1

 

Scale (0,1,2,3,4,d)

 

Circulatory - Lower Extremities

Color Lower Right    Color Lower Left

 

 

Normal         Normal

 

Neurological State

            Oriented to time-place-

Alert                      Moves all extremities

            person

 

Respiration - General

Respiration Rate

            SpO2 (%)

(B/min)

20           96

 

Final Case Assessment

Cardiovascular

HR           Rhythm         NIBP

 

58           sb           94/52

 

Edema Present      Skin color           Skin

 

None           Normal             Warm Dry

 

Neurological State

            Oriented to time-place-

Lethargic                    Moves all extremities

            person

 

Respiration - General

Respiration Rate

            SpO2 (%)

(B/min)

12           98

 

Chronological Log

Time    Study Chronological Log

 

16:23:45  Patient arrived via Bed.

 

16:23:46  Patient Name, D.O.B, / Armband Verified By R.N.

16:23:47  Consent signed by the physician and the patient and verified by the Cath Lab staff.

 

16:23:49  Anesthesia at bedside. Assumes care of patient.

 

16:23:53  Patient has been NPO for More than 6Hrs.

 

16:24:15  History and physical on the chart.

 

16:24:33  2% CHLORHEXIDINE GLUCONATE WASH AND NASAL SWIPE DONE PRIOR TO PROCEDURE.

 

16:25:55  No Skin Breakdown per pt. Bruises present to bilateral forearms only.

 

16:25:56  Patient Warmer Placed on the Table.

 

16:25:57  Disposable Defibrillator Pads Placed On Patient.

 

16:25:58  Javan Prominences Protected

 

16:26:03  A # 20 IV was noted in the Upper Arm (left). Grade = 0

 

16:26:04  A # 20 IV was noted in the Upper Arm (right). Grade = 0 0.9% NaCl @ KVO.

      Patient arrived on IV Solutions given by Edin Dickerson in Left Arm via Peripheral IV. Pump/Drip 
Flow = 20 ml/hr using

16:26:05

      NaCl .9. Ordered by Edin Dickerson.

16:27:52  Table restraints applied according to hospital policy

 

16:28:13  Bovie ground pad applied to: RIGHT UPPER THIGH

 



 

16:28:31  2% CHLORHEXIDINE GLUCONATE WASH AND NASAL SWIPE DONE PRIOR TO PROCEDURE.

 



      Assessment: Initial Case, HR=75 BPM, Rhythm=SR, DTEA=576/92 mmhg, Chest Pain=0, Edema=None, Col
or=Normal,

      Skin = Warm, Dry

      Right Pulses: Roosevelt Ped=1

      Left Pulses: Roosevelt Ped=1

16:30:09

      Lower Right Extremities: Color=Normal

      Lower Left Extremities: Color=Normal

      Neurological: State=Alert, Ox3, BAXTER

      Respiration: Resp=20 B/min, SpO2=96 %

16:38:20  Dr. Hyde from anesthesia here for intubation.

      First Sponge And Instrument Count Done by Dustin Pepe, RT(R).

16:38:57  Hypo's: 3, Sponges: 25, Bovie/scratch: 2

      Sutures: 5, Blades: 2, Instruments: 26, Syveck Patches: ~SYVECK PATCH~

16:40:29  Reference ECG taken

      2 g ANCEF given in lab by Anesthesia, CRNA via Peripheral IV. Ordered by Edin Dickerson. Reason: 
As per physicians

16:45:27

      verbal order.

      1 g VANCOMYCIN DRIP given in lab by Anesthesia, CRNA via Peripheral IV. Ordered by Edin Dickerson
. Reason: As per

16:45:33

      physicians verbal order.

16:45:54  Bilateral Upper Chest Prepped Times Two.

 

16:49:10  A sterile drape was applied after a 3 minute drying time.

 

17:00:21  MD paged

 

17:00:25  MD responded

 

17:06:29  MD arrived.

      Time Out. Correct patient, procedure, procedure equipment, site and side verified with physicia
n present. Time

17:11:54

      concurred by MD, individual staff and CRNA.

17:12:28  Case Start

 

17:12:52  20 mL 2% XYLOCAINE given in lab by Edin Dickerson in Left Upper Chest via Subcutaneous. Order
ed by Edin Dickerson.

 

17:13:01  The Subclav. Vein (Lft was manually injected with 20 cc's of contrast. OMNIPAQUE, 350 MG, 5
0ML 50ML used.

 

17:14:07  Surgical Incision Made.

 

17:16:28  A pocket was created at the  L Upper Chest.

17:17:05  Two dry sponges put into the surgical pocket.

 

17:20:31  Vascular access was obtained in the Subclav. Vein (Lft.

 

17:22:15  A SAFE SHEATH, FR8, 13CM FR 8 was advanced into the Subclav. Vein (Lft using the Percutaneo
us technique.

 

17:23:13  Access Wire re-inserted

 

17:23:49  Sheath removed

 

17:25:38  A SAFE SHEATH, FR8, 13CM FR 8 was advanced into the Fem Vein (right) using the Percutaneous
 technique.

 

17:26:47  Wire and sheath removed; pressure held

 

17:29:21  Vascular access was obtained in the Subclav. Vein (Lft.

 

17:29:38  A SAFE SHEATH, FR8, 13CM FR 8 was advanced into the Fem Art (right) using the Percutaneous 
technique.

 

17:29:51  A LEAD, PLEXA PRO-MRI DF 65/15 was inserted and positioned in the RV.

 

17:30:04  Lead placement verified under fluoroscopy

 

17:31:15  Lead placement verified under fluoroscopy

 

17:31:19  The RV lead impedance and threshold being tested.

 

17:34:24  The RV lead was sutured to the fascia.

 

17:36:26  Dry sponges removed from the surgical pocket.

 

17:37:18  A DEFIBRILLATOR, INTICA 7 VR-T DX VVE-VDDR was connected and placed in the pocket.

 

17:38:57  The pocket is being closed.

      Second Sponge And Instrument Count Done by Dustin Pepe, RT(R).

17:40:50  Hypo's: 3, Sponges: 25, Bovie/scratch: 2

      Sutures: 5, Blades: 2, Instruments: 26, Syveck Patches: ~SYVECK PATCH~

17:55:41  Case End

      First Sponge And Instrument Count Done by Dustin Pepe, RT(R).

17:56:01  Hypo's: 3, Sponges: 25, Bovie/scratch: 2

      Sutures: 6, Blades: 2, Instruments: 26, Syveck Patches: ~SYVECK PATCH~

17:56:28  Implant Procedure was performed.

 

17:56:32  A ICD Implant . (Single)

 

17:57:38  Bedside Report will be given.

 

17:57:39  PACU called. Spoke to Mindy

 

17:58:00  No case complications noted.

 

17:58:01  Cine recording checked.

 

17:58:04  Implantable Device card placed in patient's chart.

 

18:02:49  Steri-strips and a sterile dressing applied to site.

      Assessment: Final Case, HR=58 BPM, Rhythm=sb, NIBP=94/52 mmhg, Edema=None, Color=Normal, Skin =
 Warm,

      Dry

18:03:11

      Neurological: State=Lethargic, Ox3, BAXTER

      Respiration: Resp=12 B/min, SpO2=98 %

18:10:25  Patient moved to stretcher

 

18:12:16  Defibrillator and ground pads removed. Skin intact.

 

18:15:58  A sling was placed on the affected arm.

End Study - Contrast Media Used In Study

Contrast      Total Opened (mL)  Total Used (mL)     Total Wasted (mL)

 

Omnipaque     20         20            0

 

End Study - Maximum Contrast Load

Max Contrast Load (mL)

 

533.4

 

End Study - Radiation Exposure

Fluoro Time

(minutes)

5.8

 

 

End Study - Patient Disposition

Complications   Transferred To       Interventional Outcome

 

No         Telemetry Bed       successful

## 2018-05-22 NOTE — PD.CARD.PN
Subjective


Subjective Remarks


No CP, dyspnea, dizziness, palpitations.





Objective


Medications











Item Value  Date Time


 


Pravastatin Sodium 20 mg 5/22/18 2100





 (Pravachol) HS/PO 


 


Amlodipine 2.5 mg 5/21/18 1145





Besylate DAILY/PO 5/22/18 0906





 (Norvasc)  


 


Metoprolol 50 mg 5/18/18 1725





Succinate DAILY/PO 5/22/18 0906





 (Toprol Xl)  


 


Enalapril Maleate 10 mg 5/18/18 1724





 (Vasotec) DAILY/PO 5/22/18 0907


 


Aspirin 325 mg 5/18/18 0900





 (Ecotrin Ec) DAILY/PO 5/22/18 0907











Current Medications








 Medications


  (Trade)  Dose


 Ordered  Sig/Ag


 Route  Start Time


 Stop Time Status Last Admin


 


  (NS Flush)  2 ml  UNSCH  PRN


 IVF  5/17/18 13:30


    5/21/18 20:59


 


 


  (Ecotrin Ec)  325 mg  DAILY


 PO  5/18/18 09:00


    5/22/18 09:07


 


 


  (Pepcid)  20 mg  Q12HR


 PO  5/17/18 21:00


    5/22/18 09:06


 


 


  (Misc Nursing


 Information)  1  Q361D


 XX  5/17/18 15:45


     


 


 


  (Chlorhexidine


 2% Cloth)  3 pack


 Taper  DAILY@04


 TOP  5/18/18 04:00


 5/14/19 03:59  5/20/18 03:48


 


 


  (Chlorhexidine


 2% Cloth)  3 pack  UNSCH  PRN


 TOP  5/17/18 15:45


     


 


 


  (D50w (Vial) Inj)  50 ml  UNSCH  PRN


 IV PUSH  5/17/18 16:00


     


 


 


  (Glucagon Inj)  1 mg  UNSCH  PRN


 OTHER  5/17/18 16:00


     


 


 


  (NovoLIN R


 SUPPLEMENTAL


 SCALE)  1  Q6H


 SQ  5/17/18 16:00


    5/21/18 16:00


 


 


  (Restoril)  7.5 mg  HS  PRN


 PO  5/17/18 20:45


    5/18/18 21:09


 


 


  (Tylenol)  650 mg  Q6H  PRN


 PO  5/17/18 23:30


     


 


 


  (Norco  5-325 Mg)  1 tab  Q4H  PRN


 PO  5/17/18 23:30


    5/20/18 22:56


 


 


  (Morphine Inj)  2 mg  Q2H  PRN


 IV PUSH  5/17/18 23:30


     


 


 


  (Atropine Inj)  0.5 mg  UNSCH  PRN


 IV  5/18/18 12:45


     


 


 


  (Vasotec)  10 mg  DAILY


 PO  5/18/18 17:24


    5/22/18 09:07


 


 


  (Toprol Xl)  50 mg  DAILY


 PO  5/18/18 17:25


    5/22/18 09:06


 


 


  (Heparin Inj)  5,000 units  Q12HR


 SQ  5/20/18 09:00


    5/21/18 20:57


 


 


  (Albuterol Neb)  2.5 mg  Q2HR NEB  PRN


 NEB  5/20/18 14:15


     


 


 


  (Norvasc)  2.5 mg  DAILY


 PO  5/21/18 11:45


    5/22/18 09:06


 


 


  (SoluMEDROL INJ)  40 mg  Q12HR


 IV PUSH  5/21/18 21:00


    5/22/18 09:08


 


 


  (Pravachol)  20 mg  HS


 PO  5/22/18 21:00


     


 








Vital Signs / I&O





Vital Signs








  Date Time  Temp Pulse Resp B/P (MAP) Pulse Ox O2 Delivery O2 Flow Rate FiO2


 


5/22/18 07:53 97.7 66 18 131/80 (97) 95   


 


5/22/18 06:12  101      


 


5/22/18 05:00  79      


 


5/22/18 04:11 98.2 72 19 143/87 (105) 95   


 


5/22/18 04:00  70      


 


5/22/18 03:00  100      


 


5/22/18 02:00  80      


 


5/22/18 01:00  100      


 


5/22/18 00:00  102      


 


5/21/18 23:00  72      


 


5/21/18 23:00 98.6 69 19 144/85 (104) 95   


 


5/21/18 22:00  72      


 


5/21/18 21:00  90      


 


5/21/18 20:46 98.8 86 22 149/86 (107) 95   


 


5/21/18 20:00  76      


 


5/21/18 19:33     95 Nasal Cannula 2.00 


 


5/21/18 19:00  81      


 


5/21/18 18:00  72      


 


5/21/18 17:00  74      


 


5/21/18 16:00  77      


 


5/21/18 15:08  73      


 


5/21/18 15:08 97.8 73 16 139/88 (105) 96   


 


5/21/18 15:00  74      


 


5/21/18 14:00  73      














I/O      


 


 5/21/18 5/21/18 5/21/18 5/22/18 5/22/18 5/22/18





 07:00 15:00 23:00 07:00 15:00 23:00


 


Intake Total   633 ml 360 ml  


 


Output Total   1150 ml 600 ml  


 


Balance   -517 ml -240 ml  


 


      


 


Intake Oral   480 ml 360 ml  


 


IV Total   153 ml   


 


Output Urine Total   1150 ml 600 ml  


 


# Bowel Movements    1  








Physical Exam


GENERAL: Well developed, well nourished. No acute distress.


HEENT: Jugular venous pressure is normal. 


CHEST: Lungs clear to auscultation anteriorly.


CARDIAC: Regular rate and rhythm without S3, S4.  II/VI systolic murmur apex.


ABDOMEN: Soft, nontender, no hepatosplenomegaly. Bowel sounds present.


EXTREMITIES: No clubbing, cyanosis, or edema.


Laboratory





Laboratory Tests








Test


  5/22/18


12:15


 


Blood Urea Nitrogen 16 MG/DL 


 


Creatinine 0.75 MG/DL 


 


Random Glucose 129 MG/DL 


 


Calcium Level 8.9 MG/DL 


 


Sodium Level 142 MEQ/L 


 


Potassium Level 4.2 MEQ/L 


 


Chloride Level 106 MEQ/L 


 


Carbon Dioxide Level 24.6 MEQ/L 


 


Anion Gap 11 MEQ/L 


 


Estimat Glomerular Filtration


Rate 74 ML/MIN 


 











Assessment and Plan


Problem List:  


(1) Wide-complex tachycardia


ICD Codes:  I47.2 - Ventricular tachycardia


Status:  Acute


Plan:  Stable over the weekend.  No further VT.  Dr. Cordero's input noted, 

appreciated.





REC for ICD implant today





(2) CAD (coronary artery disease)


ICD Codes:  I25.10 - Atherosclerotic heart disease of native coronary artery 

without angina pectoris


Status:  Chronic


Plan:  Stable CAD status.  Cath last week shows widely patent 4 of 4 bypass 

grafts.  Suspect elevation in troponin due to her sustained VT.  





(3) Cardiomyopathy


ICD Codes:  I42.9 - Cardiomyopathy, unspecified


Status:  Chronic


Plan:  EF difficult to estimate on cath, ~ 30%.  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 

ventricular aneurysm seen on cath today.  No CHF.   Recommend continue same.





Code Status


full code


Discussed Condition With


patient





Problem Qualifiers





(1) CAD (coronary artery disease):  


Qualified Codes:  I25.10 - Atherosclerotic heart disease of native coronary 

artery without angina pectoris


(2) Cardiomyopathy:  


Qualified Codes:  I25.5 - Ischemic cardiomyopathy








Edin Dickerson MD May 22, 2018 13:22

## 2018-05-22 NOTE — HHI.PR
Subjective


Remarks


anxious about procedue


no chest pain


rhythm SR 90s- low 100s





Objective


Vitals





Vital Signs








  Date Time  Temp Pulse Resp B/P (MAP) Pulse Ox O2 Delivery O2 Flow Rate FiO2


 


5/22/18 07:53 97.7 66 18 131/80 (97) 95   


 


5/22/18 06:12  101      


 


5/22/18 05:00  79      


 


5/22/18 04:11 98.2 72 19 143/87 (105) 95   


 


5/22/18 04:00  70      


 


5/22/18 03:00  100      


 


5/22/18 02:00  80      


 


5/22/18 01:00  100      


 


5/22/18 00:00  102      


 


5/21/18 23:00  72      


 


5/21/18 23:00 98.6 69 19 144/85 (104) 95   


 


5/21/18 22:00  72      


 


5/21/18 21:00  90      


 


5/21/18 20:46 98.8 86 22 149/86 (107) 95   


 


5/21/18 20:00  76      


 


5/21/18 19:33     95 Nasal Cannula 2.00 


 


5/21/18 19:00  81      


 


5/21/18 18:00  72      


 


5/21/18 17:00  74      


 


5/21/18 16:00  77      


 


5/21/18 15:08  73      


 


5/21/18 15:08 97.8 73 16 139/88 (105) 96   


 


5/21/18 15:00  74      


 


5/21/18 14:00  73      


 


5/21/18 13:00  74      


 


5/21/18 12:00  72      


 


5/21/18 11:58 98.6 73 16 154/84 (107)    














I/O      


 


 5/21/18 5/21/18 5/21/18 5/22/18 5/22/18 5/22/18





 06:59 14:59 22:59 06:59 14:59 22:59


 


Intake Total   633 ml 360 ml  


 


Output Total   1150 ml 600 ml  


 


Balance   -517 ml -240 ml  


 


      


 


Intake Oral   480 ml 360 ml  


 


IV Total   153 ml   


 


Output Urine Total   1150 ml 600 ml  


 


# Bowel Movements    1  








Result Diagram:  


5/21/18 0520                                                                   

             5/21/18 0520





Imaging





Last Impressions








Chest X-Ray 5/19/18 0000 Signed





Impressions: 





 Service Date/Time:  Saturday, May 19, 2018 23:39 - CONCLUSION:  1. 

Cardiomegaly 





 with mild edema pattern, basilar atelectasis and small pleural effusions.     





 Waqar Henderson MD 








Objective Remarks


awake and alert, orietned x 3, appears anxious


anciteric


lungs- no rales, few inspiatory wheeze


regular rhythma


bdomen soft, nontender


extremiteis no edema


neuro exam- unremarkable





A/P


Assessment and Plan


84 years old female





Wide complex tachycardia.


History of CAD and CABG


Hypertension.


- for AICD today


- continue BB/CCB/ACE


Hyperlipidemia.


- statins- restart her Lovastatin


History of lupus.


HAD- non smoker


- continue on IV steroids for today


Hypokalemia- recheck BMP


Anxiety- one time Zena Lubin MD May 22, 2018 11:12

## 2018-05-23 VITALS — HEART RATE: 96 BPM

## 2018-05-23 VITALS
RESPIRATION RATE: 18 BRPM | HEART RATE: 83 BPM | DIASTOLIC BLOOD PRESSURE: 72 MMHG | TEMPERATURE: 98.2 F | SYSTOLIC BLOOD PRESSURE: 122 MMHG | OXYGEN SATURATION: 96 %

## 2018-05-23 VITALS
DIASTOLIC BLOOD PRESSURE: 75 MMHG | SYSTOLIC BLOOD PRESSURE: 127 MMHG | HEART RATE: 66 BPM | TEMPERATURE: 98.4 F | RESPIRATION RATE: 18 BRPM | OXYGEN SATURATION: 95 %

## 2018-05-23 VITALS
HEART RATE: 67 BPM | SYSTOLIC BLOOD PRESSURE: 133 MMHG | RESPIRATION RATE: 18 BRPM | DIASTOLIC BLOOD PRESSURE: 78 MMHG | TEMPERATURE: 98.1 F | OXYGEN SATURATION: 96 %

## 2018-05-23 VITALS
HEART RATE: 73 BPM | SYSTOLIC BLOOD PRESSURE: 138 MMHG | OXYGEN SATURATION: 98 % | RESPIRATION RATE: 14 BRPM | TEMPERATURE: 98 F | DIASTOLIC BLOOD PRESSURE: 79 MMHG

## 2018-05-23 VITALS — HEART RATE: 68 BPM

## 2018-05-23 VITALS — HEART RATE: 66 BPM

## 2018-05-23 VITALS
HEART RATE: 64 BPM | TEMPERATURE: 97.8 F | OXYGEN SATURATION: 98 % | SYSTOLIC BLOOD PRESSURE: 112 MMHG | RESPIRATION RATE: 16 BRPM | DIASTOLIC BLOOD PRESSURE: 64 MMHG

## 2018-05-23 VITALS — HEART RATE: 99 BPM

## 2018-05-23 VITALS
SYSTOLIC BLOOD PRESSURE: 112 MMHG | RESPIRATION RATE: 16 BRPM | DIASTOLIC BLOOD PRESSURE: 74 MMHG | OXYGEN SATURATION: 94 % | HEART RATE: 97 BPM

## 2018-05-23 VITALS — HEART RATE: 70 BPM

## 2018-05-23 VITALS
OXYGEN SATURATION: 92 % | DIASTOLIC BLOOD PRESSURE: 94 MMHG | TEMPERATURE: 98 F | RESPIRATION RATE: 20 BRPM | HEART RATE: 70 BPM | SYSTOLIC BLOOD PRESSURE: 150 MMHG

## 2018-05-23 VITALS — HEART RATE: 64 BPM

## 2018-05-23 VITALS — HEART RATE: 62 BPM

## 2018-05-23 VITALS
RESPIRATION RATE: 18 BRPM | SYSTOLIC BLOOD PRESSURE: 141 MMHG | TEMPERATURE: 97.6 F | HEART RATE: 61 BPM | DIASTOLIC BLOOD PRESSURE: 83 MMHG | OXYGEN SATURATION: 93 %

## 2018-05-23 VITALS — HEART RATE: 58 BPM

## 2018-05-23 VITALS — HEART RATE: 82 BPM

## 2018-05-23 VITALS — HEART RATE: 80 BPM

## 2018-05-23 VITALS — HEART RATE: 72 BPM

## 2018-05-23 VITALS — HEART RATE: 59 BPM

## 2018-05-23 VITALS — HEART RATE: 50 BPM

## 2018-05-23 LAB
ERYTHROCYTE [DISTWIDTH] IN BLOOD BY AUTOMATED COUNT: 13.6 % (ref 11.6–17.2)
HCT VFR BLD CALC: 29.1 % (ref 35–46)
HGB BLD-MCNC: 10 GM/DL (ref 11.6–15.3)
MCH RBC QN AUTO: 31 PG (ref 27–34)
MCHC RBC AUTO-ENTMCNC: 34.3 % (ref 32–36)
MCV RBC AUTO: 90.5 FL (ref 80–100)
PLATELET # BLD: 212 TH/MM3 (ref 150–450)
PMV BLD AUTO: 8.4 FL (ref 7–11)
RBC # BLD AUTO: 3.22 MIL/MM3 (ref 4–5.3)
WBC # BLD AUTO: 5.3 TH/MM3 (ref 4–11)

## 2018-05-23 RX ADMIN — HYDROCODONE BITARTRATE AND ACETAMINOPHEN PRN TAB: 5; 325 TABLET ORAL at 03:58

## 2018-05-23 RX ADMIN — METHYLPREDNISOLONE SODIUM SUCCINATE SCH MG: 40 INJECTION, POWDER, FOR SOLUTION INTRAMUSCULAR; INTRAVENOUS at 08:59

## 2018-05-23 RX ADMIN — HEPARIN SODIUM SCH UNITS: 10000 INJECTION, SOLUTION INTRAVENOUS; SUBCUTANEOUS at 21:06

## 2018-05-23 RX ADMIN — Medication PRN ML: at 09:00

## 2018-05-23 RX ADMIN — HUMAN INSULIN SCH: 100 INJECTION, SOLUTION SUBCUTANEOUS at 10:00

## 2018-05-23 RX ADMIN — ASPIRIN SCH MG: 325 TABLET, DELAYED RELEASE ORAL at 08:57

## 2018-05-23 RX ADMIN — PRAVASTATIN SODIUM SCH MG: 20 TABLET ORAL at 21:04

## 2018-05-23 RX ADMIN — HYDROCODONE BITARTRATE AND ACETAMINOPHEN PRN TAB: 5; 325 TABLET ORAL at 12:46

## 2018-05-23 RX ADMIN — HEPARIN SODIUM SCH UNITS: 10000 INJECTION, SOLUTION INTRAVENOUS; SUBCUTANEOUS at 08:58

## 2018-05-23 RX ADMIN — HUMAN INSULIN SCH: 100 INJECTION, SOLUTION SUBCUTANEOUS at 03:55

## 2018-05-23 RX ADMIN — FAMOTIDINE SCH MG: 20 TABLET, FILM COATED ORAL at 08:57

## 2018-05-23 RX ADMIN — HUMAN INSULIN SCH: 100 INJECTION, SOLUTION SUBCUTANEOUS at 16:00

## 2018-05-23 RX ADMIN — HUMAN INSULIN SCH: 100 INJECTION, SOLUTION SUBCUTANEOUS at 21:57

## 2018-05-23 RX ADMIN — FAMOTIDINE SCH MG: 20 TABLET, FILM COATED ORAL at 21:04

## 2018-05-23 RX ADMIN — HUMAN INSULIN SCH: 100 INJECTION, SOLUTION SUBCUTANEOUS at 03:54

## 2018-05-23 RX ADMIN — ENALAPRIL MALEATE SCH MG: 10 TABLET ORAL at 21:05

## 2018-05-23 RX ADMIN — METOPROLOL SUCCINATE SCH MG: 50 TABLET, FILM COATED, EXTENDED RELEASE ORAL at 08:58

## 2018-05-23 RX ADMIN — ENALAPRIL MALEATE SCH MG: 10 TABLET ORAL at 08:57

## 2018-05-23 NOTE — PD.CARD.PN
Subjective


Subjective Remarks


No CP, dyspnea, dizziness, palpitations.  No incisional pain.





Objective


Medications











Item Value  Date Time


 


Pravastatin Sodium 20 mg 5/22/18 2100





 (Pravachol) HS/PO 5/22/18 2054


 


Amlodipine 2.5 mg 5/21/18 1145





Besylate DAILY/PO 5/23/18 0857





 (Norvasc)  


 


Metoprolol 50 mg 5/18/18 1725





Succinate DAILY/PO 5/23/18 0858





 (Toprol Xl)  


 


Enalapril Maleate 10 mg 5/18/18 1724





 (Vasotec) DAILY/PO 5/23/18 0857


 


Aspirin 325 mg 5/18/18 0900





 (Ecotrin Ec) DAILY/PO 5/23/18 0857











Current Medications








 Medications


  (Trade)  Dose


 Ordered  Sig/Ag


 Route  Start Time


 Stop Time Status Last Admin


 


  (NS Flush)  2 ml  UNSCH  PRN


 IVF  5/17/18 13:30


    5/23/18 09:00


 


 


  (Ecotrin Ec)  325 mg  DAILY


 PO  5/18/18 09:00


    5/23/18 08:57


 


 


  (Pepcid)  20 mg  Q12HR


 PO  5/17/18 21:00


    5/23/18 08:57


 


 


  (Misc Nursing


 Information)  1  Q361D


 XX  5/17/18 15:45


     


 


 


  (Chlorhexidine


 2% Cloth)  


 Taper  DAILY@04


 TOP  5/18/18 04:00


 5/14/19 03:59  5/20/18 03:48


 


 


  (Chlorhexidine


 2% Cloth)  3 pack  UNSCH  PRN


 TOP  5/17/18 15:45


     


 


 


  (D50w (Vial) Inj)  50 ml  UNSCH  PRN


 IV PUSH  5/17/18 16:00


     


 


 


  (Glucagon Inj)  1 mg  UNSCH  PRN


 OTHER  5/17/18 16:00


     


 


 


  (NovoLIN R


 SUPPLEMENTAL


 SCALE)  1  Q6H


 SQ  5/17/18 16:00


    5/21/18 16:00


 


 


  (Restoril)  7.5 mg  HS  PRN


 PO  5/17/18 20:45


    5/18/18 21:09


 


 


  (Tylenol)  650 mg  Q6H  PRN


 PO  5/17/18 23:30


     


 


 


  (Norco  5-325 Mg)  1 tab  Q4H  PRN


 PO  5/17/18 23:30


    5/23/18 03:58


 


 


  (Morphine Inj)  2 mg  Q2H  PRN


 IV PUSH  5/17/18 23:30


     


 


 


  (Atropine Inj)  0.5 mg  UNSCH  PRN


 IV  5/18/18 12:45


     


 


 


  (Vasotec)  10 mg  DAILY


 PO  5/18/18 17:24


    5/23/18 08:57


 


 


  (Toprol Xl)  50 mg  DAILY


 PO  5/18/18 17:25


    5/23/18 08:58


 


 


  (Heparin Inj)  5,000 units  Q12HR


 SQ  5/20/18 09:00


    5/23/18 08:58


 


 


  (Albuterol Neb)  2.5 mg  Q2HR NEB  PRN


 NEB  5/20/18 14:15


     


 


 


  (Norvasc)  2.5 mg  DAILY


 PO  5/21/18 11:45


    5/23/18 08:57


 


 


  (Pravachol)  20 mg  HS


 PO  5/22/18 21:00


    5/22/18 20:54


 


 


 Vancomycin HCl


 1000 mg/Sodium


 Chloride  250 ml @ 0


 mls/hr  ONCE


 IV  5/22/18 14:00


 5/25/18 13:59  5/22/18 16:45


 


 


  (Betadine 5%


 Antisepsis Kit)  1 applic  ONCE


 TOPICAL  5/22/18 13:30


 5/25/18 13:29   


 


 


  (Bactroban Nasal


 2% Oint)  1 applic  ONCE


 EACH NARE  5/22/18 13:30


 5/25/18 13:29   


 


 


  (Chlorhexidine


 2% Cloth)  1 pack  ONCE


 TOPICAL  5/22/18 13:30


 5/25/18 13:29   


 


 


 Cefazolin Sodium


 2000 mg/Sodium


 Chloride  100 ml @ 


 200 mls/hr  ONCE


 IV  5/22/18 13:45


 5/25/18 13:44  5/22/18 16:45


 


 


  (Ultram)  50 mg  Q6HR  PRN


 PO  5/22/18 18:15


     


 


 


  (Misc Nursing


 Information)  ALL


 NURSING


 DEPARTME...  UNSCH  PRN


 .XX  5/22/18 19:45


 5/23/18 19:44   


 


 


  (Deltasone)  20 mg  BID


 PO  5/23/18 21:00


     


 








Vital Signs / I&O





Vital Signs








  Date Time  Temp Pulse Resp B/P (MAP) Pulse Ox O2 Delivery O2 Flow Rate FiO2


 


5/23/18 11:54  97 16 112/74 (87) 94   


 


5/23/18 07:25     96 Nasal Cannula 3.00 


 


5/23/18 07:25 97.8 64 16 112/64 (80) 98   


 


5/23/18 05:53  62      


 


5/23/18 05:00  58      


 


5/23/18 04:58   20     


 


5/23/18 04:00      Nasal Cannula 3.00 





      Humidified  


 


5/23/18 04:00 98.1 65 18 133/78 (96) 96   


 


5/23/18 04:00  60      


 


5/23/18 04:00  67      


 


5/23/18 02:00  68      


 


5/23/18 01:00  68      


 


5/23/18 00:00  83      


 


5/23/18 00:00      Nasal Cannula 3.00 





      Humidified  


 


5/23/18 00:00 98.2 64 18 122/72 (89) 96   


 


5/22/18 23:00  64      


 


5/22/18 22:00  68      


 


5/22/18 21:00  66      


 


5/22/18 21:00  66      


 


5/22/18 20:00  61      


 


5/22/18 19:40 98.1 95 18 125/82 (96) 94   


 


5/22/18 19:40      Nasal Cannula 3.00 





      Humidified  


 


5/22/18 19:16 97.3 63 20 119/69 (86) 95 Nasal Cannula 3 


 


5/22/18 19:00  62 16 125/72 (89) 93 Nasal Cannula 3 


 


5/22/18 18:45  66 16 129/71 (90) 92 Nasal Cannula 3 


 


5/22/18 18:30  66 16 125/71 (89) 89 Nasal Cannula 3 


 


5/22/18 18:19 97.7 73 16 132/70 (90) 87 Nasal Cannula 3 


 


5/22/18 16:00  70      


 


5/22/18 15:00  75      


 


5/22/18 15:00 97.9 68 18 143/82 (102) 94   


 


5/22/18 14:00  70      


 


5/22/18 13:00  72      














I/O      


 


 5/22/18 5/22/18 5/22/18 5/23/18 5/23/18 5/23/18





 06:59 14:59 22:59 06:59 14:59 22:59


 


Intake Total 360 ml  40 ml  250 ml 


 


Output Total 600 ml  350 ml   


 


Balance -240 ml  -310 ml  250 ml 


 


      


 


Intake Oral 360 ml  40 ml   


 


IV Total     250 ml 


 


Output Urine Total 600 ml  350 ml   


 


# Bowel Movements 1     








Physical Exam


GENERAL: Well developed, well nourished. No acute distress.


HEENT: Jugular venous pressure is normal. 


CHEST: Lungs clear to auscultation anteriorly.  ICD site clean, dry, intact, no 

hematoma.


CARDIAC: Regular rate and rhythm without S3, S4.  II/VI systolic murmur apex.


ABDOMEN: Soft, nontender, no hepatosplenomegaly. Bowel sounds present.


EXTREMITIES: No clubbing, cyanosis, or edema.


Laboratory





Laboratory Tests








Test


  5/22/18


12:15 5/23/18


02:16


 


Blood Urea Nitrogen 16 MG/DL  


 


Creatinine 0.75 MG/DL  


 


Random Glucose 129 MG/DL  


 


Calcium Level 8.9 MG/DL  


 


Sodium Level 142 MEQ/L  


 


Potassium Level 4.2 MEQ/L  


 


Chloride Level 106 MEQ/L  


 


Carbon Dioxide Level 24.6 MEQ/L  


 


Anion Gap 11 MEQ/L  


 


Estimat Glomerular Filtration


Rate 74 ML/MIN 


  


 


 


White Blood Count  5.3 TH/MM3 


 


Red Blood Count  3.22 MIL/MM3 


 


Hemoglobin  10.0 GM/DL 


 


Hematocrit  29.1 % 


 


Mean Corpuscular Volume  90.5 FL 


 


Mean Corpuscular Hemoglobin  31.0 PG 


 


Mean Corpuscular Hemoglobin


Concent 


  34.3 % 


 


 


Red Cell Distribution Width  13.6 % 


 


Platelet Count  212 TH/MM3 


 


Mean Platelet Volume  8.4 FL 








Imaging





Last 24 hours Impressions








Chest X-Ray 5/23/18 0600 Signed





Impressions: 





 CONCLUSION: 


 


Chest X-Ray 5/22/18 1802 Signed





Impressions: 





 CONCLUSION: 











Assessment and Plan


Problem List:  


(1) Wide-complex tachycardia


ICD Codes:  I47.2 - Ventricular tachycardia


Status:  Acute


Plan:  Stable. No further VT.  Patient now s/p ICD implant.  ICD site OK.  ICD 

re-interrogation shows good, stable pacing/defibrillatory parameters.  





REC OK to discharge from my standpoint if repeat CXR shows no progression in 

tiny post op pneumothorax


         f/u in our office next week to recheck ICD and incision site


         3-4 week f/u with Dr. Stokes





(2) CAD (coronary artery disease)


ICD Codes:  I25.10 - Atherosclerotic heart disease of native coronary artery 

without angina pectoris


Status:  Chronic


Plan:  Stable CAD status.  Cath last week shows widely patent 4 of 4 bypass 

grafts.  Suspect elevation in troponin due to her sustained VT.  





(3) Cardiomyopathy


ICD Codes:  I42.9 - Cardiomyopathy, unspecified


Status:  Chronic


Plan:  EF difficult to estimate on cath, ~ 30%.  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  No CHF.   Recommend continue beta blocker, 

ACE-I.   Would favor increase in ACE-I dosing and stopping amlodipine.





Code Status


full code


Discussed Condition With


patient





Problem Qualifiers





(1) CAD (coronary artery disease):  


Qualified Codes:  I25.10 - Atherosclerotic heart disease of native coronary 

artery without angina pectoris


(2) Cardiomyopathy:  


Qualified Codes:  I25.5 - Ischemic cardiomyopathy








Edin Dickerson MD May 23, 2018 12:03

## 2018-05-23 NOTE — HHI.PR
Subjective


Remarks


no shortness of breath


telemetry- sinus


some pain on incision site- relieve with po pain meds





c





Objective


Vitals





Vital Signs








  Date Time  Temp Pulse Resp B/P (MAP) Pulse Ox O2 Delivery O2 Flow Rate FiO2


 


5/23/18 07:25     96 Nasal Cannula 3.00 


 


5/23/18 07:25 97.8 64 16 112/64 (80) 98   


 


5/23/18 05:53  62      


 


5/23/18 05:00  58      


 


5/23/18 04:58   20     


 


5/23/18 04:00      Nasal Cannula 3.00 





      Humidified  


 


5/23/18 04:00 98.1 65 18 133/78 (96) 96   


 


5/23/18 04:00  60      


 


5/23/18 04:00  67      


 


5/23/18 02:00  68      


 


5/23/18 01:00  68      


 


5/23/18 00:00  83      


 


5/23/18 00:00      Nasal Cannula 3.00 





      Humidified  


 


5/23/18 00:00 98.2 64 18 122/72 (89) 96   


 


5/22/18 23:00  64      


 


5/22/18 22:00  68      


 


5/22/18 21:00  66      


 


5/22/18 21:00  66      


 


5/22/18 20:00  61      


 


5/22/18 19:40 98.1 95 18 125/82 (96) 94   


 


5/22/18 19:40      Nasal Cannula 3.00 





      Humidified  


 


5/22/18 19:16 97.3 63 20 119/69 (86) 95 Nasal Cannula 3 


 


5/22/18 19:00  62 16 125/72 (89) 93 Nasal Cannula 3 


 


5/22/18 18:45  66 16 129/71 (90) 92 Nasal Cannula 3 


 


5/22/18 18:30  66 16 125/71 (89) 89 Nasal Cannula 3 


 


5/22/18 18:19 97.7 73 16 132/70 (90) 87 Nasal Cannula 3 


 


5/22/18 16:00  70      


 


5/22/18 15:00  75      


 


5/22/18 15:00 97.9 68 18 143/82 (102) 94   


 


5/22/18 14:00  70      


 


5/22/18 13:00  72      


 


5/22/18 12:00  75      


 


5/22/18 11:00  100      


 


5/22/18 11:00 97.7 72 18 158/89 (112) 95   


 


5/22/18 10:00  66      














I/O      


 


 5/22/18 5/22/18 5/22/18 5/23/18 5/23/18 5/23/18





 07:00 15:00 23:00 07:00 15:00 23:00


 


Intake Total 360 ml  40 ml  250 ml 


 


Output Total 600 ml  350 ml   


 


Balance -240 ml  -310 ml  250 ml 


 


      


 


Intake Oral 360 ml  40 ml   


 


IV Total     250 ml 


 


Output Urine Total 600 ml  350 ml   


 


# Bowel Movements 1     








Result Diagram:  


5/23/18 0216                                                                   

             5/22/18 1215





Imaging





Last Impressions








Chest X-Ray 5/23/18 0600 Signed





Impressions: 





 CONCLUSION: 








Objective Remarks


awake and alert, oriented x 3, tearful- states some death in the family


anciteric


left upper chest wall- inciison site- dry, no erythema


lungs- no rales,no wheezes, no rales


regular rhythm


abdomen soft, nontender


extremiteis no edema


neuro exam- unremarkable


Procedures


5/22- AICD placement





A/P


Assessment and Plan


84 years old female





Wide complex tachycardia. S/P AICD 5/22


Small left apical Pneumothorax post procedure- asymptomatic


- ff sats, 02 NC, FF up CXR


History of CAD and CABG


Hypertension.


- continue BB/CCB/ACE


Hyperlipidemia.


- statins- restart her Lovastatin


History of lupus.


HAD- non smoker- lungs- clear - no wheezes


- - change to po prednisone- short course


Hypokalemia- resolved


Anxiety- patient states recent deaths in the family-





PT consult- deconditioning. S/P AICD- LUE- on S/S


case management consult for DC planning- home with home health or SNF if 

reommended by PT after evaluation











Zena Triplett MD May 23, 2018 09:49

## 2018-05-23 NOTE — RADRPT
EXAM DATE:  5/23/2018 4:27 AM EDT

AGE/SEX:        84 years / Female



INDICATIONS:  Short of breath.



CLINICAL DATA:  This is the patient's subsequent encounter. Patient reports that signs and symptoms h
ave been present for 1 week and indicates a pain score of 0/10. 

                                                                          

MEDICAL/SURGICAL HISTORY:       Non-responsive. CABG.



[place]      [facility] [place].  .



FINDINGS:  

A single AP semierect view of the chest was obtained. A small left apical pneumothorax is again noted
 without change. The patient is status post median sternotomy for coronary bypass grafting procedure.
 The left subclavian transvenous pacer remains in place. The heart size remains mildly prominent. Per
ihilar opacities are present as well as abnormal opacity at the left lung base with obscuration of th
e left hemidiaphragm.



CONCLUSION: 

1.  Small left apical pneumothorax without change.

2.  Cardiomegaly with perihilar and left basilar opacity remaining.



Electronically signed by: Jose Vaughn MD  5/23/2018 4:37 AM EDT

## 2018-05-24 VITALS
HEART RATE: 66 BPM | SYSTOLIC BLOOD PRESSURE: 131 MMHG | DIASTOLIC BLOOD PRESSURE: 76 MMHG | TEMPERATURE: 98 F | RESPIRATION RATE: 18 BRPM | OXYGEN SATURATION: 92 %

## 2018-05-24 VITALS
TEMPERATURE: 97.6 F | DIASTOLIC BLOOD PRESSURE: 84 MMHG | OXYGEN SATURATION: 93 % | SYSTOLIC BLOOD PRESSURE: 147 MMHG | RESPIRATION RATE: 20 BRPM | HEART RATE: 64 BPM

## 2018-05-24 VITALS
RESPIRATION RATE: 20 BRPM | HEART RATE: 100 BPM | SYSTOLIC BLOOD PRESSURE: 154 MMHG | OXYGEN SATURATION: 96 % | TEMPERATURE: 97.6 F | DIASTOLIC BLOOD PRESSURE: 79 MMHG

## 2018-05-24 VITALS — HEART RATE: 50 BPM

## 2018-05-24 VITALS
DIASTOLIC BLOOD PRESSURE: 89 MMHG | SYSTOLIC BLOOD PRESSURE: 148 MMHG | RESPIRATION RATE: 16 BRPM | HEART RATE: 62 BPM | TEMPERATURE: 97.4 F | OXYGEN SATURATION: 96 %

## 2018-05-24 VITALS
OXYGEN SATURATION: 94 % | HEART RATE: 70 BPM | SYSTOLIC BLOOD PRESSURE: 128 MMHG | DIASTOLIC BLOOD PRESSURE: 67 MMHG | TEMPERATURE: 98 F | RESPIRATION RATE: 18 BRPM

## 2018-05-24 VITALS — HEART RATE: 63 BPM

## 2018-05-24 VITALS — HEART RATE: 60 BPM

## 2018-05-24 VITALS — HEART RATE: 68 BPM

## 2018-05-24 VITALS — HEART RATE: 104 BPM

## 2018-05-24 VITALS — HEART RATE: 62 BPM

## 2018-05-24 VITALS — HEART RATE: 53 BPM

## 2018-05-24 VITALS — HEART RATE: 96 BPM

## 2018-05-24 VITALS — HEART RATE: 74 BPM

## 2018-05-24 VITALS — HEART RATE: 103 BPM

## 2018-05-24 VITALS — OXYGEN SATURATION: 94 %

## 2018-05-24 VITALS — HEART RATE: 94 BPM

## 2018-05-24 VITALS — HEART RATE: 59 BPM

## 2018-05-24 VITALS — HEART RATE: 102 BPM

## 2018-05-24 VITALS — HEART RATE: 106 BPM

## 2018-05-24 VITALS — HEART RATE: 70 BPM

## 2018-05-24 RX ADMIN — METOPROLOL SUCCINATE SCH MG: 50 TABLET, FILM COATED, EXTENDED RELEASE ORAL at 09:54

## 2018-05-24 RX ADMIN — HUMAN INSULIN SCH: 100 INJECTION, SOLUTION SUBCUTANEOUS at 04:00

## 2018-05-24 RX ADMIN — ENALAPRIL MALEATE SCH MG: 10 TABLET ORAL at 09:55

## 2018-05-24 RX ADMIN — HEPARIN SODIUM SCH UNITS: 10000 INJECTION, SOLUTION INTRAVENOUS; SUBCUTANEOUS at 09:54

## 2018-05-24 RX ADMIN — HEPARIN SODIUM SCH UNITS: 10000 INJECTION, SOLUTION INTRAVENOUS; SUBCUTANEOUS at 22:59

## 2018-05-24 RX ADMIN — FAMOTIDINE SCH MG: 20 TABLET, FILM COATED ORAL at 09:54

## 2018-05-24 RX ADMIN — HUMAN INSULIN SCH: 100 INJECTION, SOLUTION SUBCUTANEOUS at 10:40

## 2018-05-24 RX ADMIN — HUMAN INSULIN SCH: 100 INJECTION, SOLUTION SUBCUTANEOUS at 17:34

## 2018-05-24 RX ADMIN — PRAVASTATIN SODIUM SCH MG: 20 TABLET ORAL at 22:42

## 2018-05-24 RX ADMIN — ASPIRIN SCH MG: 325 TABLET, DELAYED RELEASE ORAL at 09:54

## 2018-05-24 RX ADMIN — ENALAPRIL MALEATE SCH MG: 10 TABLET ORAL at 22:42

## 2018-05-24 RX ADMIN — CHLORHEXIDINE GLUCONATE SCH PACK: 500 CLOTH TOPICAL at 04:00

## 2018-05-24 RX ADMIN — FAMOTIDINE SCH MG: 20 TABLET, FILM COATED ORAL at 22:42

## 2018-05-24 RX ADMIN — HUMAN INSULIN SCH: 100 INJECTION, SOLUTION SUBCUTANEOUS at 22:00

## 2018-05-24 NOTE — HHI.PR
Subjective


Remarks


telemetry sinus


no complains of pain





wanting to go to [place]


no nuasea or vomiting





Objective


Vitals





Vital Signs








  Date Time  Temp Pulse Resp B/P (MAP) Pulse Ox O2 Delivery O2 Flow Rate FiO2


 


5/24/18 05:59  59      


 


5/24/18 05:00  60      


 


5/24/18 04:08  103      


 


5/24/18 03:34 97.6 64 20 147/84 (105) 93   


 


5/24/18 02:00  60      


 


5/24/18 01:00  53      


 


5/23/18 23:45      Room Air  


 


5/23/18 23:23  59      


 


5/23/18 23:00 97.6 61 18 141/83 (102) 93   


 


5/23/18 23:00  93      


 


5/23/18 21:00  72      


 


5/23/18 20:35 98.0 70 20 150/94 (112) 92   


 


5/23/18 20:00      Room Air  


 


5/23/18 19:56  99      


 


5/23/18 18:00  64      


 


5/23/18 17:37 98.0 73 14 138/79 (98) 98   


 


5/23/18 17:00  64      


 


5/23/18 16:33 98.4 66 18 127/75 (92) 95   


 


5/23/18 16:00  70      


 


5/23/18 15:00  50      


 


5/23/18 14:00  70      


 


5/23/18 13:00  68      


 


5/23/18 12:00  66      


 


5/23/18 11:54  97 16 112/74 (87) 94   


 


5/23/18 11:00  96      


 


5/23/18 10:00  82      


 


5/23/18 09:00  80      














I/O      


 


 5/23/18 5/23/18 5/23/18 5/24/18 5/24/18 5/24/18





 07:00 15:00 23:00 07:00 15:00 23:00


 


Intake Total  250 ml 720 ml 300 ml  


 


Output Total   450 ml 900 ml  


 


Balance  250 ml 270 ml -600 ml  


 


      


 


Intake Oral   720 ml 300 ml  


 


IV Total  250 ml    


 


Output Urine Total   450 ml 900 ml  








Result Diagram:  


5/23/18 0216                                                                   

             5/22/18 1215





Imaging





Last Impressions








Chest X-Ray 5/23/18 0600 Signed





Impressions: 





 CONCLUSION: 





 1.  Small left apical pneumothorax without change.





 2.  Cardiomegaly with perihilar and left basilar opacity remaining.





  





 








Objective Remarks


awake and alert, oriented x 3 


anicteric 


left upper chest wall- incison site- dry, no erythema


lungs- no rales,no wheezes, no rales


regular rhythm


abdomen soft, nontender


LUE- S/S in place- good pulses


extremities no edema


neuro exam- unremarkable


Procedures


5/22- AICD placement





A/P


Assessment and Plan


84 years old female





Wide complex tachycardia. S/P AICD 5/22


Small left apical Pneumothorax post procedure- asymptomatic


- ff sats, FF up CXR- stable 


History of CAD and CABG


Hypertension.


- continue BBACE


Hyperlipidemia.


- statins-


History of lupus.


HAD- non smoker- lungs- clear - no wheezes


- - change to po prednisone- short course


Hypokalemia- resolved


Anxiety- patient states recent deaths in the family- Ativan 





PT consult- deconditioning. S/P AICD- LUE- on S/S


case management consult for DC planning- home with home health or SNF if 

recommended by PT after evaluation











Zena Triplett MD May 24, 2018 08:15

## 2018-05-25 VITALS
SYSTOLIC BLOOD PRESSURE: 156 MMHG | DIASTOLIC BLOOD PRESSURE: 94 MMHG | RESPIRATION RATE: 20 BRPM | OXYGEN SATURATION: 95 % | TEMPERATURE: 97.6 F | HEART RATE: 62 BPM

## 2018-05-25 VITALS
HEART RATE: 100 BPM | TEMPERATURE: 97.9 F | SYSTOLIC BLOOD PRESSURE: 147 MMHG | RESPIRATION RATE: 22 BRPM | DIASTOLIC BLOOD PRESSURE: 89 MMHG | OXYGEN SATURATION: 98 %

## 2018-05-25 VITALS — HEART RATE: 72 BPM

## 2018-05-25 VITALS — HEART RATE: 68 BPM

## 2018-05-25 VITALS — OXYGEN SATURATION: 93 %

## 2018-05-25 VITALS — HEART RATE: 62 BPM

## 2018-05-25 VITALS
RESPIRATION RATE: 22 BRPM | OXYGEN SATURATION: 96 % | TEMPERATURE: 98.1 F | HEART RATE: 69 BPM | SYSTOLIC BLOOD PRESSURE: 149 MMHG | DIASTOLIC BLOOD PRESSURE: 88 MMHG

## 2018-05-25 VITALS — HEART RATE: 92 BPM

## 2018-05-25 VITALS
TEMPERATURE: 98.4 F | DIASTOLIC BLOOD PRESSURE: 68 MMHG | HEART RATE: 71 BPM | SYSTOLIC BLOOD PRESSURE: 151 MMHG | RESPIRATION RATE: 22 BRPM | OXYGEN SATURATION: 97 %

## 2018-05-25 VITALS — HEART RATE: 96 BPM

## 2018-05-25 VITALS — RESPIRATION RATE: 16 BRPM | OXYGEN SATURATION: 98 % | HEART RATE: 92 BPM

## 2018-05-25 VITALS — HEART RATE: 116 BPM

## 2018-05-25 VITALS — HEART RATE: 83 BPM

## 2018-05-25 VITALS — HEART RATE: 66 BPM

## 2018-05-25 VITALS — HEART RATE: 59 BPM

## 2018-05-25 VITALS — HEART RATE: 94 BPM

## 2018-05-25 VITALS — HEART RATE: 100 BPM

## 2018-05-25 VITALS — HEART RATE: 61 BPM

## 2018-05-25 RX ADMIN — HUMAN INSULIN SCH: 100 INJECTION, SOLUTION SUBCUTANEOUS at 20:39

## 2018-05-25 RX ADMIN — ENALAPRIL MALEATE SCH MG: 10 TABLET ORAL at 09:04

## 2018-05-25 RX ADMIN — HEPARIN SODIUM SCH UNITS: 10000 INJECTION, SOLUTION INTRAVENOUS; SUBCUTANEOUS at 09:05

## 2018-05-25 RX ADMIN — FAMOTIDINE SCH MG: 20 TABLET, FILM COATED ORAL at 09:04

## 2018-05-25 RX ADMIN — ASPIRIN SCH MG: 325 TABLET, DELAYED RELEASE ORAL at 09:04

## 2018-05-25 RX ADMIN — FAMOTIDINE SCH MG: 20 TABLET, FILM COATED ORAL at 20:35

## 2018-05-25 RX ADMIN — METOPROLOL SUCCINATE SCH MG: 50 TABLET, FILM COATED, EXTENDED RELEASE ORAL at 09:04

## 2018-05-25 RX ADMIN — HUMAN INSULIN SCH: 100 INJECTION, SOLUTION SUBCUTANEOUS at 09:05

## 2018-05-25 RX ADMIN — HUMAN INSULIN SCH: 100 INJECTION, SOLUTION SUBCUTANEOUS at 04:00

## 2018-05-25 RX ADMIN — HEPARIN SODIUM SCH UNITS: 10000 INJECTION, SOLUTION INTRAVENOUS; SUBCUTANEOUS at 20:35

## 2018-05-25 RX ADMIN — ENALAPRIL MALEATE SCH MG: 10 TABLET ORAL at 20:35

## 2018-05-25 RX ADMIN — CHLORHEXIDINE GLUCONATE SCH PACK: 500 CLOTH TOPICAL at 04:00

## 2018-05-25 RX ADMIN — PRAVASTATIN SODIUM SCH MG: 20 TABLET ORAL at 20:35

## 2018-05-25 RX ADMIN — HUMAN INSULIN SCH: 100 INJECTION, SOLUTION SUBCUTANEOUS at 13:31

## 2018-05-25 NOTE — HHI.PR
Subjective


Remarks


up on chair already


very interactive


no complains





Objective


Vitals





Vital Signs








  Date Time  Temp Pulse Resp B/P (MAP) Pulse Ox O2 Delivery O2 Flow Rate FiO2


 


5/25/18 07:30 97.6 62 20 156/94 (114) 95   


 


5/25/18 07:30     95 Room Air  


 


5/25/18 06:00  62      


 


5/25/18 05:00  62      


 


5/25/18 04:00  61      


 


5/25/18 03:00  83      


 


5/25/18 02:00  92      


 


5/25/18 01:00  62      


 


5/25/18 00:00  92 16  98   


 


5/25/18 00:00  92      


 


5/24/18 23:00  62      


 


5/24/18 22:00  50      


 


5/24/18 21:00 97.4 62 16 148/89 (108) 96   


 


5/24/18 21:00  65      


 


5/24/18 20:18      Room Air  21


 


5/24/18 20:14     94 Nasal Cannula 2.00 


 


5/24/18 20:00  68      


 


5/24/18 19:00  70      


 


5/24/18 18:00  74      


 


5/24/18 17:00  62      


 


5/24/18 16:00  94      


 


5/24/18 15:04 98.0 70 18 128/67 (87) 94   


 


5/24/18 15:00  63      


 


5/24/18 14:00  102      


 


5/24/18 13:00  96      


 


5/24/18 12:00  68      


 


5/24/18 11:00 97.6 100 20 154/79 (104) 96   


 


5/24/18 11:00  74      


 


5/24/18 10:00  106      














I/O      


 


 5/24/18 5/24/18 5/24/18 5/25/18 5/25/18 5/25/18





 07:00 15:00 23:00 07:00 15:00 23:00


 


Intake Total 300 ml  680 ml 480 ml  


 


Output Total 900 ml  450 ml   


 


Balance -600 ml  230 ml 480 ml  


 


      


 


Intake Oral 300 ml  680 ml 480 ml  


 


Output Urine Total 900 ml  450 ml   


 


# Voids   2 3  


 


# Bowel Movements   1   








Result Diagram:  


5/23/18 0216                                                                   

             5/22/18 1215





Imaging





Last Impressions








Chest X-Ray 5/23/18 0600 Signed





Impressions: 





 CONCLUSION: 





 1.  Small left apical pneumothorax without change.





 2.  Cardiomegaly with perihilar and left basilar opacity remaining.





  





 








Objective Remarks


awake and alert, oriented x 3 


anicteric 


left upper chest wall- incison site- dry, no erythema


lungs- no rales,no wheezes, no rales


regular rhythm


abdomen soft, nontender


LUE- S/S in place- good pulses


extremities no edema


neuro exam- unremarkable


Procedures


5/22- AICD placement





A/P


Assessment and Plan


84 years old female





Wide complex tachycardia. S/P AICD 5/22


Small left apical Pneumothorax post procedure- asymptomatic


- ff sats, FF up CXR- stable 


History of CAD and CABG


Hypertension.


- continue BB/ACE


Hyperlipidemia.


- statins-


History of lupus.


HAD- non smoker- lungs- clear - no wheezes


- - change to po prednisone- short course


Hypokalemia- resolved


Anxiety- patient states recent deaths in the family- Ativan 





PT consult- deconditioning. S/P AICD- LUE- on S/S


case management consult for DC planning- home with home health or SNF if 

recommended by PT after evaluation











Zena Triplett MD May 25, 2018 09:32

## 2018-05-26 VITALS
RESPIRATION RATE: 20 BRPM | SYSTOLIC BLOOD PRESSURE: 138 MMHG | OXYGEN SATURATION: 96 % | DIASTOLIC BLOOD PRESSURE: 78 MMHG | HEART RATE: 80 BPM | TEMPERATURE: 98.1 F

## 2018-05-26 VITALS
OXYGEN SATURATION: 96 % | TEMPERATURE: 98.3 F | DIASTOLIC BLOOD PRESSURE: 89 MMHG | HEART RATE: 57 BPM | SYSTOLIC BLOOD PRESSURE: 153 MMHG | RESPIRATION RATE: 18 BRPM

## 2018-05-26 VITALS
TEMPERATURE: 97.8 F | HEART RATE: 104 BPM | SYSTOLIC BLOOD PRESSURE: 128 MMHG | RESPIRATION RATE: 20 BRPM | OXYGEN SATURATION: 96 % | DIASTOLIC BLOOD PRESSURE: 64 MMHG

## 2018-05-26 VITALS
OXYGEN SATURATION: 95 % | TEMPERATURE: 98.1 F | HEART RATE: 57 BPM | SYSTOLIC BLOOD PRESSURE: 157 MMHG | DIASTOLIC BLOOD PRESSURE: 84 MMHG | RESPIRATION RATE: 20 BRPM

## 2018-05-26 VITALS — HEART RATE: 56 BPM

## 2018-05-26 VITALS — HEART RATE: 66 BPM

## 2018-05-26 VITALS — HEART RATE: 65 BPM

## 2018-05-26 VITALS
HEART RATE: 68 BPM | SYSTOLIC BLOOD PRESSURE: 135 MMHG | DIASTOLIC BLOOD PRESSURE: 77 MMHG | TEMPERATURE: 97.6 F | RESPIRATION RATE: 20 BRPM | OXYGEN SATURATION: 96 %

## 2018-05-26 VITALS — HEART RATE: 58 BPM

## 2018-05-26 VITALS — HEART RATE: 80 BPM

## 2018-05-26 VITALS — HEART RATE: 60 BPM

## 2018-05-26 VITALS — HEART RATE: 57 BPM

## 2018-05-26 VITALS — HEART RATE: 102 BPM

## 2018-05-26 VITALS — HEART RATE: 59 BPM

## 2018-05-26 LAB
ERYTHROCYTE [DISTWIDTH] IN BLOOD BY AUTOMATED COUNT: 13.7 % (ref 11.6–17.2)
HCT VFR BLD CALC: 37.3 % (ref 35–46)
HGB BLD-MCNC: 12.8 GM/DL (ref 11.6–15.3)
MCH RBC QN AUTO: 33.1 PG (ref 27–34)
MCHC RBC AUTO-ENTMCNC: 34.4 % (ref 32–36)
MCV RBC AUTO: 96.1 FL (ref 80–100)
PLATELET # BLD: 126 TH/MM3 (ref 150–450)
PMV BLD AUTO: 9.4 FL (ref 7–11)
RBC # BLD AUTO: 3.88 MIL/MM3 (ref 4–5.3)
WBC # BLD AUTO: 9.5 TH/MM3 (ref 4–11)

## 2018-05-26 RX ADMIN — ASPIRIN SCH MG: 325 TABLET, DELAYED RELEASE ORAL at 11:04

## 2018-05-26 RX ADMIN — METOPROLOL SUCCINATE SCH MG: 50 TABLET, FILM COATED, EXTENDED RELEASE ORAL at 11:04

## 2018-05-26 RX ADMIN — HUMAN INSULIN SCH: 100 INJECTION, SOLUTION SUBCUTANEOUS at 10:00

## 2018-05-26 RX ADMIN — CHLORHEXIDINE GLUCONATE SCH PACK: 500 CLOTH TOPICAL at 04:00

## 2018-05-26 RX ADMIN — HUMAN INSULIN SCH: 100 INJECTION, SOLUTION SUBCUTANEOUS at 04:00

## 2018-05-26 RX ADMIN — HUMAN INSULIN SCH: 100 INJECTION, SOLUTION SUBCUTANEOUS at 16:00

## 2018-05-26 RX ADMIN — FAMOTIDINE SCH MG: 20 TABLET, FILM COATED ORAL at 11:04

## 2018-05-26 RX ADMIN — HEPARIN SODIUM SCH UNITS: 10000 INJECTION, SOLUTION INTRAVENOUS; SUBCUTANEOUS at 11:05

## 2018-05-26 RX ADMIN — ENALAPRIL MALEATE SCH MG: 10 TABLET ORAL at 11:04

## 2018-05-26 NOTE — HHI.PR
Subjective


Remarks


telemetry- in sinus rhythm


willing to go to SNF - looking forward





Objective


Vitals





Vital Signs








  Date Time  Temp Pulse Resp B/P (MAP) Pulse Ox O2 Delivery O2 Flow Rate FiO2


 


5/26/18 06:00  57      


 


5/26/18 05:00  58      


 


5/26/18 04:00      Room Air  


 


5/26/18 04:00 98.3 57 18 153/89 (110) 96   


 


5/26/18 04:00  57      


 


5/26/18 03:00  59      


 


5/26/18 02:00  60      


 


5/26/18 01:00  56      


 


5/26/18 00:00      Room Air  


 


5/26/18 00:00  57      


 


5/26/18 00:00 98.1 57 20 157/84 (108) 95   


 


5/25/18 23:00  59      


 


5/25/18 22:00  62      


 


5/25/18 21:00  68      


 


5/25/18 20:00      Room Air  


 


5/25/18 20:00  71      


 


5/25/18 20:00 98.4 71 22 151/68 (95) 97   


 


5/25/18 18:00  68      


 


5/25/18 17:00  68      


 


5/25/18 16:00  72      


 


5/25/18 15:00 98.1 69 22 149/88 (108) 96   


 


5/25/18 15:00  73      


 


5/25/18 14:00  66      


 


5/25/18 13:00  66      


 


5/25/18 12:00  96      


 


5/25/18 11:00 97.9 62 22 147/89 (108) 98   


 


5/25/18 11:00  100      














I/O      


 


 5/25/18 5/25/18 5/25/18 5/26/18 5/26/18 5/26/18





 07:00 15:00 23:00 07:00 15:00 23:00


 


Intake Total 480 ml  680 ml 480 ml  


 


Output Total   200 ml 800 ml  


 


Balance 480 ml  480 ml -320 ml  


 


      


 


Intake Oral 480 ml  680 ml 480 ml  


 


Output Urine Total   200 ml 800 ml  


 


# Voids 3 1 3   


 


# Bowel Movements  1 1 0  








Result Diagram:  


5/26/18 0454                                                                   

             5/22/18 1215





Imaging





Last Impressions








Chest X-Ray 5/23/18 0600 Signed





Impressions: 





 CONCLUSION: 





 1.  Small left apical pneumothorax without change.





 2.  Cardiomegaly with perihilar and left basilar opacity remaining.





  





 








Objective Remarks


awake and alert, oriented x 3 


anicteric 


left upper chest wall- incison site- dry, no erythema


lungs- no rales,no wheezes, no rales


regular rhythm


abdomen soft, nontender


LUE- S/S in place- good pulses


extremities no edema


neuro exam- unremarkable


Procedures


5/22- AICD placement





A/P


Assessment and Plan


84 years old female





Wide complex tachycardia. S/P AICD 5/22


Small left apical Pneumothorax post procedure- asymptomatic


- ff sats, FF up CXR- stable 


History of CAD and CABG


Hypertension.


- continue BB/ACE


Hyperlipidemia.


- statins-


History of lupus.


HAD- non smoker- lungs- clear - no wheezes. s/p shourt course Prednisone


Hypokalemia- resolved


Anxiety- patient states recent deaths in the family- Ativan 





PT consult- deconditioning. S/P AICD- LUE- on S/S


case management - ff - SNF today











Zena Triplett MD May 26, 2018 10:44

## 2018-05-26 NOTE — HHI.DS
__________________________________________________





Discharge Summary


Admission Date


May 17, 2018 at 15:42


Discharge Date:  May 26, 2018


Admitting Diagnosis





wide complex tachycardia





(1) S/P implantation of automatic cardioverter/defibrillator (AICD)


ICD Code:  Z95.810 - Presence of automatic (implantable) cardiac defibrillator


Diagnosis:  Principal





(2) Cardiomyopathy


ICD Code:  I42.9 - Cardiomyopathy, unspecified


Diagnosis:  Principal


Status:  Chronic


Procedures


5/22- AICD placement


Brief History - From Admission


The patient is an 84-year-old female with past medical history of 


coronary artery disease, previous MI in 1986, status post CABG in 


2001, hypertension, hyperlipidemia and lupus who presented to [facility] ED from Dr. Stokes's office after she was found to 


be in a wide complex tachycardia during an echocardiogram.  The 


patient denies any palpitations, lightheadedness, shortness of breath,


chest pain, syncope; however, she reports chronic edema of the left 


lower extremity.  EKG showed wide complex tachycardia and the patient 


was given adenosine 6 mg and 12 mg bolus without any effect on her 


heart rate.  She was subsequently given amiodarone bolus which was 


repeated and placed on amiodarone drip.  The patient was seen by Dr. Dickerson from Cardiology service.  Her laboratory data showed elevated 


troponin at 1.84.  A chest x-ray in the ED showed compensated 


cardiomegaly, atelectatic changes left hemidiaphragm and laterally in 


the left perihilar distribution.  The patient denies any fever, chills


or any constitutional symptoms.  She denies any similar presentation 


in the past.


CBC/BMP:  


5/26/18 0454                                                                   

             5/22/18 1215





Significant Findings





Laboratory Tests








Test


  5/26/18


04:54


 


Red Blood Count


  3.88 MIL/MM3


(4.00-5.30)


 


Platelet Count


  126 TH/MM3


(150-450)








Imaging





Last Impressions








Chest X-Ray 5/23/18 0600 Signed





Impressions: 





 CONCLUSION: 





 1.  Small left apical pneumothorax without change.





 2.  Cardiomegaly with perihilar and left basilar opacity remaining.





  





 








PE at Discharge


awake and alert, oriented x 3 


anicteric 


left upper chest wall- incison site- dry, no erythema


lungs- no rales,no wheezes, no rales


regular rhythm


abdomen soft, nontender


LUE- S/S in place- good pulses


extremities no edema


neuro exam- unremarkable


Pt update on day of discharge


afebrile


telemetry- in SR


Hospital Course


84 years old female





Wide complex tachycardia. S/P AICD 5/22


Small left apical Pneumothorax post procedure- asymptomatic


- ff sats, FF up CXR- stable 


History of CAD and CABG


Hypertension.


- continue BB/ACE


Hyperlipidemia.


- statins-


History of lupus.


HAD- non smoker- lungs- clear - no wheezes


- - change to po prednisone- short course


Hypokalemia- resolved


Anxiety- patient states recent deaths in the family- Ativan 





PT consult- deconditioning. S/P AICD- LUE- on S/S


case management - ff - SNF today





[facility]  [facility] incision site


FF up with cardiology in 2-3 weeks











Zena Triplett MD May 26, 2018 10:44


Pt Condition on Discharge:  Stable


Discharge Disposition:  Discharge to SNF


Discharge Time:  > 30 minutes


Discharge Instructions


DIET: Follow Instructions for:  Heart Healthy Diet


Speech Therapy-Diet Recommends:  Regular


Activities you can perform:  Weight Bearing as Yolanda


Other Activity Instructions:  


routine post AICD instructions


Follow up Referrals:  


Cardiology - 3 Weeks with Kenrick Stokes MD


Cardiology - 1 Week with [facility] heart [facility] office


PCP Follow-up - 1 Week with pcp





New Medications:  


Prednisone (Prednisone) 5 Mg Tab


5 MG PO DAILY for HAD for 2 Days, #2 TAB 0 Refills





Aspirin DR (Aspirin EC) 325 Mg Tabdr


325 MG PO DAILY for arrhy for 30 Days, #30 TAB





Enalapril (Enalapril) 10 Mg Tab


10 MG PO BID for HTN for 30 Days, #60 TAB





Hydrocodone/Acetaminophen (Hydrocodone-Acetamin 5-325 mg) 5 Mg-325 Mg Tablet


1 TAB PO Q6HR PRN for PAIN SCALE 4 TO 10, #20 TAB





Metoprolol Succinate ER 24 HR (Metoprolol Succinate ER 24 HR) 50 Mg Tab


50 MG PO DAILY for ARRHY for 30 Days, #30 TAB





Pravastatin (Pravachol) 20 Mg Tab


20 MG PO HS for HLP for 30 Days, TAB

















Zena Triplett MD May 26, 2018 10:45

## 2018-06-02 ENCOUNTER — HOSPITAL ENCOUNTER (INPATIENT)
Dept: HOSPITAL 17 - NEPE | Age: 83
LOS: 4 days | Discharge: NURSING, REHAB OR LONG-TERM CARE | DRG: 309 | End: 2018-06-06
Attending: HOSPITALIST | Admitting: HOSPITALIST
Payer: COMMERCIAL

## 2018-06-02 VITALS — HEART RATE: 90 BPM

## 2018-06-02 VITALS
SYSTOLIC BLOOD PRESSURE: 114 MMHG | OXYGEN SATURATION: 97 % | HEART RATE: 90 BPM | RESPIRATION RATE: 16 BRPM | DIASTOLIC BLOOD PRESSURE: 81 MMHG

## 2018-06-02 VITALS
DIASTOLIC BLOOD PRESSURE: 77 MMHG | RESPIRATION RATE: 16 BRPM | SYSTOLIC BLOOD PRESSURE: 147 MMHG | HEART RATE: 89 BPM | TEMPERATURE: 98.3 F | OXYGEN SATURATION: 96 %

## 2018-06-02 VITALS
HEART RATE: 84 BPM | SYSTOLIC BLOOD PRESSURE: 145 MMHG | TEMPERATURE: 97.3 F | RESPIRATION RATE: 18 BRPM | OXYGEN SATURATION: 96 % | DIASTOLIC BLOOD PRESSURE: 78 MMHG

## 2018-06-02 VITALS — HEART RATE: 134 BPM

## 2018-06-02 VITALS — BODY MASS INDEX: 25.01 KG/M2 | WEIGHT: 155.65 LBS | HEIGHT: 66 IN

## 2018-06-02 VITALS
HEART RATE: 114 BPM | DIASTOLIC BLOOD PRESSURE: 85 MMHG | OXYGEN SATURATION: 100 % | TEMPERATURE: 98 F | RESPIRATION RATE: 20 BRPM | SYSTOLIC BLOOD PRESSURE: 149 MMHG

## 2018-06-02 VITALS
DIASTOLIC BLOOD PRESSURE: 72 MMHG | SYSTOLIC BLOOD PRESSURE: 120 MMHG | HEART RATE: 97 BPM | RESPIRATION RATE: 16 BRPM | TEMPERATURE: 97.8 F | OXYGEN SATURATION: 97 %

## 2018-06-02 VITALS
HEART RATE: 94 BPM | OXYGEN SATURATION: 95 % | DIASTOLIC BLOOD PRESSURE: 64 MMHG | SYSTOLIC BLOOD PRESSURE: 133 MMHG | RESPIRATION RATE: 16 BRPM

## 2018-06-02 VITALS
DIASTOLIC BLOOD PRESSURE: 67 MMHG | OXYGEN SATURATION: 97 % | SYSTOLIC BLOOD PRESSURE: 136 MMHG | TEMPERATURE: 98.3 F | RESPIRATION RATE: 16 BRPM | HEART RATE: 92 BPM

## 2018-06-02 VITALS — HEART RATE: 93 BPM

## 2018-06-02 VITALS — HEART RATE: 92 BPM

## 2018-06-02 VITALS
SYSTOLIC BLOOD PRESSURE: 135 MMHG | RESPIRATION RATE: 16 BRPM | HEART RATE: 89 BPM | OXYGEN SATURATION: 98 % | DIASTOLIC BLOOD PRESSURE: 69 MMHG

## 2018-06-02 VITALS — OXYGEN SATURATION: 96 %

## 2018-06-02 VITALS — HEART RATE: 95 BPM

## 2018-06-02 VITALS — DIASTOLIC BLOOD PRESSURE: 72 MMHG | SYSTOLIC BLOOD PRESSURE: 144 MMHG

## 2018-06-02 VITALS — HEART RATE: 114 BPM

## 2018-06-02 VITALS — HEART RATE: 62 BPM

## 2018-06-02 VITALS — HEART RATE: 91 BPM

## 2018-06-02 DIAGNOSIS — E78.5: ICD-10-CM

## 2018-06-02 DIAGNOSIS — I25.5: ICD-10-CM

## 2018-06-02 DIAGNOSIS — Z95.810: ICD-10-CM

## 2018-06-02 DIAGNOSIS — Z79.82: ICD-10-CM

## 2018-06-02 DIAGNOSIS — Z82.49: ICD-10-CM

## 2018-06-02 DIAGNOSIS — Z87.440: ICD-10-CM

## 2018-06-02 DIAGNOSIS — I25.2: ICD-10-CM

## 2018-06-02 DIAGNOSIS — I50.22: ICD-10-CM

## 2018-06-02 DIAGNOSIS — L30.4: ICD-10-CM

## 2018-06-02 DIAGNOSIS — Z95.1: ICD-10-CM

## 2018-06-02 DIAGNOSIS — M32.9: ICD-10-CM

## 2018-06-02 DIAGNOSIS — I47.2: Primary | ICD-10-CM

## 2018-06-02 DIAGNOSIS — H91.90: ICD-10-CM

## 2018-06-02 DIAGNOSIS — R04.0: ICD-10-CM

## 2018-06-02 DIAGNOSIS — Z87.891: ICD-10-CM

## 2018-06-02 DIAGNOSIS — I11.0: ICD-10-CM

## 2018-06-02 DIAGNOSIS — I25.10: ICD-10-CM

## 2018-06-02 LAB
ALBUMIN SERPL-MCNC: 3.2 GM/DL (ref 3.4–5)
ALP SERPL-CCNC: 70 U/L (ref 45–117)
ALT SERPL-CCNC: 31 U/L (ref 10–53)
AST SERPL-CCNC: 22 U/L (ref 15–37)
BASOPHILS # BLD AUTO: 0 TH/MM3 (ref 0–0.2)
BASOPHILS NFR BLD: 0.4 % (ref 0–2)
BILIRUB SERPL-MCNC: 0.6 MG/DL (ref 0.2–1)
BUN SERPL-MCNC: 16 MG/DL (ref 7–18)
CALCIUM SERPL-MCNC: 8 MG/DL (ref 8.5–10.1)
CHLORIDE SERPL-SCNC: 108 MEQ/L (ref 98–107)
COLOR UR: (no result)
CREAT SERPL-MCNC: 0.88 MG/DL (ref 0.5–1)
EOSINOPHIL # BLD: 0.1 TH/MM3 (ref 0–0.4)
EOSINOPHIL NFR BLD: 0.6 % (ref 0–4)
ERYTHROCYTE [DISTWIDTH] IN BLOOD BY AUTOMATED COUNT: 13.8 % (ref 11.6–17.2)
GFR SERPLBLD BASED ON 1.73 SQ M-ARVRAT: 61 ML/MIN (ref 89–?)
GLUCOSE SERPL-MCNC: 120 MG/DL (ref 74–106)
GLUCOSE UR STRIP-MCNC: (no result) MG/DL
HCO3 BLD-SCNC: 23.6 MEQ/L (ref 21–32)
HCT VFR BLD CALC: 38.8 % (ref 35–46)
HGB BLD-MCNC: 13.1 GM/DL (ref 11.6–15.3)
HGB UR QL STRIP: (no result)
INR PPP: 1 RATIO
KETONES UR STRIP-MCNC: 10 MG/DL
LYMPHOCYTES # BLD AUTO: 1 TH/MM3 (ref 1–4.8)
LYMPHOCYTES NFR BLD AUTO: 9.9 % (ref 9–44)
MAGNESIUM SERPL-MCNC: 2 MG/DL (ref 1.5–2.5)
MCH RBC QN AUTO: 32.6 PG (ref 27–34)
MCHC RBC AUTO-ENTMCNC: 33.8 % (ref 32–36)
MCV RBC AUTO: 96.6 FL (ref 80–100)
MONOCYTE #: 0.6 TH/MM3 (ref 0–0.9)
MONOCYTES NFR BLD: 5.4 % (ref 0–8)
MUCOUS THREADS #/AREA URNS LPF: (no result) /LPF
NEUTROPHILS # BLD AUTO: 8.8 TH/MM3 (ref 1.8–7.7)
NEUTROPHILS NFR BLD AUTO: 83.7 % (ref 16–70)
NITRITE UR QL STRIP: (no result)
PLATELET # BLD: 126 TH/MM3 (ref 150–450)
PMV BLD AUTO: 9.5 FL (ref 7–11)
PROT SERPL-MCNC: 5.8 GM/DL (ref 6.4–8.2)
PROTHROMBIN TIME: 10.4 SEC (ref 9.8–11.6)
RBC # BLD AUTO: 4.01 MIL/MM3 (ref 4–5.3)
SODIUM SERPL-SCNC: 143 MEQ/L (ref 136–145)
SP GR UR STRIP: 1.01 (ref 1–1.03)
SQUAMOUS #/AREA URNS HPF: 1 /HPF (ref 0–5)
TROPONIN I SERPL-MCNC: 0.32 NG/ML (ref 0.02–0.05)
URINE LEUKOCYTE ESTERASE: (no result)
WBC # BLD AUTO: 10.5 TH/MM3 (ref 4–11)

## 2018-06-02 PROCEDURE — 80053 COMPREHEN METABOLIC PANEL: CPT

## 2018-06-02 PROCEDURE — 83880 ASSAY OF NATRIURETIC PEPTIDE: CPT

## 2018-06-02 PROCEDURE — 4B02XTZ MEASUREMENT OF CARDIAC DEFIBRILLATOR, EXTERNAL APPROACH: ICD-10-PCS | Performed by: EMERGENCY MEDICINE

## 2018-06-02 PROCEDURE — 85025 COMPLETE CBC W/AUTO DIFF WBC: CPT

## 2018-06-02 PROCEDURE — 71045 X-RAY EXAM CHEST 1 VIEW: CPT

## 2018-06-02 PROCEDURE — 85730 THROMBOPLASTIN TIME PARTIAL: CPT

## 2018-06-02 PROCEDURE — 82550 ASSAY OF CK (CPK): CPT

## 2018-06-02 PROCEDURE — 84484 ASSAY OF TROPONIN QUANT: CPT

## 2018-06-02 PROCEDURE — 83690 ASSAY OF LIPASE: CPT

## 2018-06-02 PROCEDURE — 93005 ELECTROCARDIOGRAM TRACING: CPT

## 2018-06-02 PROCEDURE — 83735 ASSAY OF MAGNESIUM: CPT

## 2018-06-02 PROCEDURE — 81001 URINALYSIS AUTO W/SCOPE: CPT

## 2018-06-02 PROCEDURE — 85610 PROTHROMBIN TIME: CPT

## 2018-06-02 PROCEDURE — 76937 US GUIDE VASCULAR ACCESS: CPT

## 2018-06-02 PROCEDURE — 96374 THER/PROPH/DIAG INJ IV PUSH: CPT

## 2018-06-02 PROCEDURE — 80048 BASIC METABOLIC PNL TOTAL CA: CPT

## 2018-06-02 RX ADMIN — Medication SCH ML: at 20:48

## 2018-06-02 RX ADMIN — METOPROLOL TARTRATE SCH MG: 25 TABLET, FILM COATED ORAL at 23:46

## 2018-06-02 RX ADMIN — ENOXAPARIN SODIUM SCH MG: 40 INJECTION SUBCUTANEOUS at 12:00

## 2018-06-02 RX ADMIN — METOPROLOL TARTRATE SCH MG: 25 TABLET, FILM COATED ORAL at 18:29

## 2018-06-02 RX ADMIN — AMIODARONE HYDROCHLORIDE PRN MLS/HR: 50 INJECTION, SOLUTION INTRAVENOUS at 19:35

## 2018-06-02 RX ADMIN — VALSARTAN SCH MG: 80 TABLET ORAL at 20:48

## 2018-06-02 RX ADMIN — PRAVASTATIN SODIUM SCH MG: 20 TABLET ORAL at 20:48

## 2018-06-02 RX ADMIN — AMIODARONE HYDROCHLORIDE PRN MLS/HR: 50 INJECTION, SOLUTION INTRAVENOUS at 15:13

## 2018-06-02 NOTE — HHI.HP
__________________________________________________





[facility]


Service


[facility]


Primary Care Physician


Choco Powell MD


Admission Diagnosis





VTACH , S/P MULTIPLE AICD DISCHARGE


Diagnoses:  


(1) Ventricular tachycardia


Travel History


International Travel<30 Days:  No


Contact w/Intl Traveler <30 Da:  No


Traveled to Known Affected Are:  No


History of Present Illness


This is an 84-year-old female with a history of prolapsed vagina and cystitis.  

She presented to the ER after her AICD which was recently placed kept firing 

off multiple times over last night and this morning.  She states that the 

sensation was unbearable.  She feels that she was asymptomatic prior to the 

firings and that frustrated her.  Interrogation of the AICD reveals that she 

was having recurrent episodes of ventricular tachycardia.  Her AICD was placed 

2 weeks ago with Dr. Dickerson and she was discharged a week later to [facility] rehab where she has remained until today.  Her primary cardiologist is 

Dr. Stokes.  Other history includes lupus and recent urinary tract infection.





Review of Systems


Constitutional:  DENIES: Diaphoretic episodes, Fatigue, Fever, Weight gain, 

Weight loss


Endocrine:  DENIES: Abnorml menstrual pattern, Polydipsia, Polyuria


Eyes:  DENIES: Blurred vision, Diplopia, Eye inflammation, Eye pain, Vision loss


Ears, nose, mouth, throat:  DENIES: Hearing loss, Vertigo, Throat pain, 

Hoarseness, Ear Pain, Running Nose, Sinus Pain, Toothache


Respiratory:  DENIES: Apneas, Cough, Snoring, Wheezing, Hemoptysis, Sputum 

production


Cardiovascular:  COMPLAINS OF: Palpitations, DENIES: Chest pain, Syncope, 

Dyspnea on Exertion, Lower Extremity Edema, Orthopnea


Gastrointestinal:  DENIES: Abdominal pain, Black stools, Bloody stools, 

Constipation, Diarrhea, Nausea, Vomiting


Genitourinary:  COMPLAINS OF: Urgency, Nocturia, DENIES: Abnormal vaginal 

bleeding, Dysmenorrhea, Urinary frequency, Hematuria, Dysuria


Musculoskeletal:  DENIES: Joint pain, Muscle aches, Stiffness


Integumentary:  DENIES: Abnormal pigmentation, Pruritus, Rash, Nail changes


Hematologic/lymphatic:  DENIES: Bruising, Lymphadenopathy


Immunologic/allergic:  DENIES: Eczema, Urticaria


Neurologic:  DENIES: Abnormal gait, Headache, Localized weakness, Paresthesias


Psychiatric:  COMPLAINS OF: Anxiety, DENIES: Confusion, Mood changes, Depression

, Hallucinations





Past Family Social History


Past Medical History


Vaginal prolapse, coronary artery disease, urinary tract infection, lupus, 

ventricular tachycardia


Past Surgical History


CABG , hysterectomy, AICD placement (2 weeks ago)


Allergies:  


Coded Allergies:  


     lorazepam (Verified  Allergy, Severe, other, 18)


 Makes patient feel disoriented and everything is on the


 ground.


Family History


Coronary artery disease


Social History


Denies smoking or alcohol use





Physical Exam


Vital Signs





Vital Signs








  Date Time  Temp Pulse Resp B/P (MAP) Pulse Ox O2 Delivery O2 Flow Rate FiO2


 


18 14:00  90      


 


18 13:00  114      


 


18 12:21  89 16 144/72 (96) 95   


 


18 12:00 98.0 114 20 149/85 (106) 100   


 


18 12:00  112      


 


18 11:01  94 16 133/64 (87) 95 Room Air  


 


18 10:35  120  134/61    


 


18 09:30  89 16 135/69 (91) 98 Room Air  


 


18 07:22     96   


 


18 07:13 98.3 92 16 136/67 (90) 97 Room Air  


 


18 07:13  92 16  97 Room Air  


 


18 07:06 98.3 89 16 147/77 (100) 96   








Physical Exam


GENERAL: This is a well-nourished, well-developed patient, anxious about AICD 

firing off and causing discomfort


SKIN: No rashes, ecchymoses or lesions. Cool and dry.  4 cm surgical scar on 

left pectoralis that is clean dry and intact without signs of infection


HEAD: Atraumatic. Normocephalic. No temporal or scalp tenderness.


EYES: Pupils equal round and reactive. Extraocular motions intact. No scleral 

icterus. No injection or drainage. 


ENT: Nose without bleeding, purulent drainage or septal hematoma. Throat 

without erythema, tonsillar hypertrophy or exudate. Uvula midline. Airway 

patent.


NECK: Trachea midline. No JVD or lymphadenopathy. Supple, nontender, no 

meningeal signs.


CARDIOVASCULAR: Tachycardia, regular rate without murmurs, gallops, or rubs. 


RESPIRATORY: Clear to auscultation. Breath sounds equal bilaterally. No wheezes

, rales, or rhonchi.  


GASTROINTESTINAL: Abdomen soft, non-tender, nondistended. No hepato-splenomegaly

, or palpable masses. No guarding.


MUSCULOSKELETAL: Extremities without clubbing, cyanosis, or edema. No joint 

tenderness, effusion, or edema noted. No calf tenderness. Negative Homans sign 

bilaterally.


NEUROLOGICAL: Awake and alert. Cranial nerves II through XII intact.  Motor and 

sensory grossly within normal limits. Five out of 5 muscle strength in all 

muscle groups.  Normal speech.


Laboratory





Laboratory Tests








Test


  18


07:10 18


11:51


 


White Blood Count 10.5  


 


Red Blood Count 4.01  


 


Hemoglobin 13.1  


 


Hematocrit 38.8  


 


Mean Corpuscular Volume 96.6  


 


Mean Corpuscular Hemoglobin 32.6  


 


Mean Corpuscular Hemoglobin


Concent 33.8 


  


 


 


Red Cell Distribution Width 13.8  


 


Platelet Count 126  


 


Mean Platelet Volume 9.5  


 


Neutrophils (%) (Auto) 83.7  


 


Lymphocytes (%) (Auto) 9.9  


 


Monocytes (%) (Auto) 5.4  


 


Eosinophils (%) (Auto) 0.6  


 


Basophils (%) (Auto) 0.4  


 


Neutrophils # (Auto) 8.8  


 


Lymphocytes # (Auto) 1.0  


 


Monocytes # (Auto) 0.6  


 


Eosinophils # (Auto) 0.1  


 


Basophils # (Auto) 0.0  


 


CBC Comment DIFF FINAL  


 


Differential Comment   


 


Prothrombin Time 10.4  


 


Prothromb Time International


Ratio 1.0 


  


 


 


Activated Partial


Thromboplast Time 26.2 


  


 


 


Blood Urea Nitrogen 16  


 


Creatinine 0.88  


 


Random Glucose 120  


 


Total Protein 5.8  


 


Albumin 3.2  


 


Calcium Level 8.0  


 


Magnesium Level 2.0  


 


Alkaline Phosphatase 70  


 


Aspartate Amino Transf


(AST/SGOT) 22 


  


 


 


Alanine Aminotransferase


(ALT/SGPT) 31 


  


 


 


Total Bilirubin 0.6  


 


Sodium Level 143  


 


Potassium Level 3.5  


 


Chloride Level 108  


 


Carbon Dioxide Level 23.6  


 


Anion Gap 11  


 


Estimat Glomerular Filtration


Rate 61 


  


 


 


Total Creatine Kinase 56  


 


Troponin I 0.32  0.95 


 


B-Type Natriuretic Peptide 726  


 


Lipase 116  








Result Diagram:  


18 0710                                                                    

            18 0710








Septic Shock Reassessment


Septic shock perfusion:  reassessment completed





Caprini VTE Risk Assessment


Caprini VTE Risk Assessment:  Mod/High Risk (score >= 2)


Caprini Risk Assessment Model











 Point Value = 1          Point Value = 2  Point Value = 3  Point Value = 5


 


Age 41-60


Minor surgery


BMI > 25 kg/m2


Swollen legs


Varicose veins


Pregnancy or postpartum


History of unexplained or recurrent


   spontaneous 


Oral contraceptives or hormone


   replacement


Sepsis (< 1 month)


Serious lung disease, including


   pneumonia (< 1 month)


Abnormal pulmonary function


Acute myocardial infarction


Congestive heart failure (< 1 month)


History of inflammatory bowel disease


Medical patient at bed rest Age 61-74


Arthroscopic surgery


Major open surgery (> 45 min)


Laparoscopic surgery (> 45 min)


Malignancy


Confined to bed (> 72 hours)


Immobilizing plaster cast


Central venous access Age >= 75


History of VTE


Family history of VTE


Factor V Leiden


Prothrombin 09454P


Lupus anticoagulant


Anticardiolipin antibodies


Elevated serum homocysteine


Heparin-induced thrombocytopenia


Other congenital or acquired


   thrombophilia Stroke (< 1 month)


Elective arthroplasty


Hip, pelvis, or leg fracture


Acute spinal cord injury (< 1 month)








Prophylaxis Regimen











   Total Risk


Factor Score Risk Level Prophylaxis Regimen


 


0-1      Low Early ambulation


 


2 Moderate Order ONE of the following:


*Sequential Compression Device (SCD)


*Heparin 5000 units SQ BID


 


3-4 Higher Order ONE of the following medications:


*Heparin 5000 units SQ TID


*Enoxaparin/Lovenox 40 mg SQ daily (WT < 150 kg, CrCl > 30 mL/min)


*Enoxaparin/Lovenox 30 mg SQ daily (WT < 150 kg, CrCl > 10-29 mL/min)


*Enoxaparin/Lovenox 30 mg SQ BID (WT < 150 kg, CrCl > 30 mL/min)


AND/OR


*Sequential Compression Device (SCD)


 


5 or more Highest Order ONE of the following medications:


*Heparin 5000 units SQ TID (Preferred with Epidurals)


*Enoxaparin/Lovenox 40 mg SQ daily (WT < 150 kg, CrCl > 30 mL/min)


*Enoxaparin/Lovenox 30 mg SQ daily (WT < 150 kg, CrCl > 10-29 mL/min)


*Enoxaparin/Lovenox 30 mg SQ BID (WT < 150 kg, CrCl > 30 mL/min)


AND


*Sequential Compression Device (SCD)











Assessment and Plan


Problem List:  


(1) Ventricular tachycardia


ICD Code:  I47.2 - Ventricular tachycardia


Assessment and Plan








Ventricular tachycardia


Patient's AICD has been firing off 13 times since 2 AM this past evening


Technician interrogated the AICD and it is working appropriately


Patient was loaded with amiodarone in the ER


Begin amiodarone drip


Cardiology consulted for medication management





Recurrent urinary tract infections


Patient has a history of prolapsed vagina with pessary which gives her high 

risk for recurrent UTI


Patient was recently treated with Macrobid for UTI, finish treatment 2 weeks 

ago around the time that she had her first V. tach episode


There is a history of starting Levaquin at [facility], no results are 

available


Urinalysis is pending, if UTI is present that could be an underlying cause for 

her dysrhythmia


Follow urine results





h/o coronary artery disease


Patient had recent angiogram which was clear


Elevation of troponins is likely due to rapid heart rate cardiac workload 

rather than ischemic disease





DVT prophylaxis


Cuauhtemoc Ronquillo MD 2018 15:12

## 2018-06-02 NOTE — MB
cc:

Thaddeus Lobo MD, Vance E MD

****

 

 

DATE:

06/02/2018

 

REASON FOR CONSULTATION:

Evaluation and treatment of ventricular tachycardia.

 

HISTORY OF PRESENT ILLNESS:

Ivelisse Deutsch is an 84-year-old male, well known to [facility] practice.  The patient is known to have ischemic heart disease. 

She had a previous myocardial infarction in 1986, status post bypass 

surgery in 2001.  She underwent a cardiac catheterization by Dr. Dickerson

05/18.  Her ejection fraction was about 30% with akinesis and possibly

even aneurysm formation of the basal inferior wall.  The LAD was 

occluded, but it had a patent left internal mammary bypass.  There was

50% diffuse distal LAD disease.  There was a vein graft patent to the 

diagonal branch as well.  The circumflex artery had a 90% stenosis 

with competitive flow from a vein graft patent to the first obtuse 

marginal branch.  The right coronary artery was totally occluded with 

a patent vein graft.  Since there was no revascularization needed, she

went on to have a Biotronik single lead defibrillator that was 

implanted 05/22.  She went to [facility] for rehab because she was

too weak to be able to ambulate.  She comes in now having received 

shocks last night.   She was in bed, just getting back from the 

bathroom and got shocked.  She has been shocked more than one time.  

She was shocked one time when I was actually in the room.  She is on 

IV amiodarone at this point.  I gave her an additional bolus of IV 

amiodarone, a dose of Ativan and 2 doses of IV metoprolol and, at this

time, she is currently more stable.  I have had a long discussion with

the  granddaughter and granddaughter-in-law that were in the room.  

The patient as not having anginal symptoms.  She does have significant

fatigue and shortness of breath with exertion, but as mentioned does 

have a depressed ejection fraction of 30%.

 

PAST MEDICAL HISTORY:

Includes coronary artery disease, diverticulitis 2012, hyperlipidemia,

hypertension and a history of lupus.

 

MEDICATIONS:

Charted.

1.  Pravastatin 20 mg at bedtime.

2.  Enalapril 10 b.i.d.

3.  Metoprolol succinate 50 mg daily.

4.  Aspirin daily.

 

FAMILY HISTORY:

Noncontributory.

 

SOCIAL HISTORY:

She is a former smoker.

 

REVIEW OF SYSTEMS:

Otherwise is negative.

 

PHYSICAL EXAMINATION:

GENERAL:  Shows an extremely anxious, well-developed white female, in 

no acute distress.

VITAL SIGNS:  Charted.

HEENT:  Unremarkable.

NECK:  Shows no JVD.  No bruits.

CHEST:  Clear to auscultation.

CARDIOVASCULAR:  S1, S2, regular rate and rhythm.  I cannot appreciate

any murmurs.

ABDOMEN:  Soft.

EXTREMITIES:  Reveal no peripheral edema.

 

CARDIOLOGY STUDIES:

The EKG in her chart shows an ectopic atrial rhythm at a rate of 112. 

She has evidence for an old inferior infarct.

 

LABORATORY DATA:

Hematocrit 38.8, white blood cell count 10,500.  Creatinine is 0.88.  

Initial troponin was 0.32 repeat 0.95.

 

IMAGING STUDIES:

Her chest x-ray is negative.

 

IMPRESSION:

Multiple bouts of ventricular tachycardia for which ATP on her 

Biotronik device is just failing to convert her and she is getting 

rescue shocks.  The patient is extremely anxious.  She has class III 

congestive heart failure symptoms.

 

PLAN:

Change her metoprolol to 25 mg every 6 hours or give her 2 doses of IV

metoprolol.  She has a history of adverse reactions to Ativan, but is 

not allergic to it.  She has gotten 0.5 mg p.o. I will also give her 

0.5 mg IV to help sedate her. I gave her an additional 150 mg bolus of

amiodarone.  Continue telemetry.  Continue close observation.  I have 

had a long discussion with the granddaughter. I am transitioning her 

enalapril to valsartan and, after 36 hours, we will upgrade her 

angiotensin converting enzyme inhibitor to an ARNI, i.e., Entresto 

starting at a dose of 24/26 b.i.d. to help with her symptoms.  Further

therapy to be determined.

 

 

__________________________________

MD GI Tomlin/

D: 06/02/2018, 06:02 PM

T: 06/02/2018, 06:27 PM

Visit #: [id number]

Job #: [id number]

## 2018-06-02 NOTE — EKG
Date Performed: 06/02/2018       Time Performed: 07:07:50

 

PTAGE:      84 years

 

EKG:      Sinus rhythm 

 

 WITH OCCASIONAL VENTRICULAR PREMATURE COMPLEXES WITH OCCASIONAL SUPRAVENTRICULAR PREMATURE COMPLEXES
 INTRAVENTRICULAR CONDUCTION DELAY MODERATE VOLTAGE CRITERIA FOR LVH, CONSIDER NORMAL VARIANT INFERIO
R MYOCARDIAL INFARCTION ABNORMAL ECG

 

PREVIOUS TRACING       : 05/20/2018 12.55 Since previous tracing, the ectopy is new. T-wave changes s
lightly improved. The R-wave progression across the precordium is also improved.

 

DOCTOR:   Ranulfo Cuevas  Interpretating Date/Time  06/02/2018 15:07:00

## 2018-06-02 NOTE — PD
HPI


Chief Complaint:  Defibrillator fire


Time Seen by Provider:  07:06


Travel History


International Travel<30 days:  No


Contact w/Intl Traveler<30days:  No


Traveled to known affect area:  No





History of Present Illness


HPI


Patient was suddenly awakened by sharp from her defibrillator, and then 2 hours 

later she stated it went off again she called 911.





No known drug allergy


Past medical history significant for hard of hearing, hysterectomy, lupus, 

cholecystectomy, and left subclavian AICD Biotronik





PFS


Past Medical History


Cancer:  No


Cardiovascular Problems:  No


Chemotherapy:  No


Endocrine:  No


Genitourinary:  Yes (urgency after hysterectomy)


Immune Disorder:  Yes (lupus)


Musculoskeletal:  No


Neurologic:  No


Psychiatric:  No


Reproductive:  No


Respiratory:  No


Radiation Therapy:  No


Sickle Cell Disease:  No





Social History


Alcohol Use:  No


Tobacco Use:  No


Substance Use:  No





Allergies-Medications


(Allergen,Severity, Reaction):  


Coded Allergies:  


     lorazepam (Verified  Allergy, Severe, other, 6/2/18)


 Makes patient feel disoriented and everything is on the


 ground.


Reported Meds & Prescriptions





Reported Meds & Active Scripts


Active


Hydrocodone-Acetamin 5-325 mg (Hydrocodone/Acetaminophen) 5 Mg-325 Mg Tablet 1 

Tab PO Q6HR PRN


Aspirin EC (Aspirin) 325 Mg Tabdr 325 Mg PO DAILY 30 Days


Enalapril (Enalapril Maleate) 10 Mg Tab 10 Mg PO BID 30 Days


Metoprolol Succinate ER 24 HR (Metoprolol Succinate) 50 Mg Tab 50 Mg PO DAILY 

30 Days


Pravachol (Pravastatin) 20 Mg Tab 20 Mg PO HS 30 Days


Reported


Levaquin (Levofloxacin) 500 Mg Tablet 500 Mg PO DAILY








Review of Systems


General / Constitutional:  No: Fever


Eyes:  No: Visual changes


HENT:  No: Headaches


Cardiovascular:  Positive: Chest Pain or Discomfort


Respiratory:  No: Shortness of Breath


Gastrointestinal:  No: Abdominal Pain


Genitourinary:  No: Dysuria


Musculoskeletal:  No: Pain


Skin:  No Rash


Neurologic:  No: Weakness


Psychiatric:  No: Depression


Endocrine:  No: Polydipsia


Hematologic/Lymphatic:  No: Easy Bruising





Physical Exam


Narrative


GENERAL: 


SKIN: Warm and dry.


HEAD: Atraumatic. Normocephalic. 


EYES: Pupils equal and round. No scleral icterus. No injection or drainage. 


ENT: No nasal bleeding or discharge.  Mucous membranes pink and moist.


NECK: Trachea midline. No JVD. 


CARDIOVASCULAR: Regular rate and rhythm.  


RESPIRATORY: No accessory muscle use. Clear to auscultation. Breath sounds 

equal bilaterally. 


GASTROINTESTINAL: Abdomen soft, non-tender, nondistended. 


MUSCULOSKELETAL: Extremities without clubbing, cyanosis, or edema. No obvious 

deformities. 


NEUROLOGICAL: Awake and alert. No obvious cranial nerve deficits.  Motor 

grossly within normal limits. Five out of 5 muscle strength in the arms and 

legs.  Normal speech.


PSYCHIATRIC: Appropriate mood and affect; insight and judgment normal.





Data


Data


Last Documented VS





Vital Signs








  Date Time  Temp Pulse Resp B/P (MAP) Pulse Ox O2 Delivery O2 Flow Rate FiO2


 


6/2/18 07:22     96   


 


6/2/18 07:13 98.3 92 16   Room Air  








Orders





 Orders


Electrocardiogram (6/2/18 07:06)


B-Type Natriuretic Peptide (6/2/18 07:06)


Ckmb (Isoenzyme) Profile (6/2/18 07:06)


Complete Blood Count With Diff (6/2/18 07:06)


Comprehensive Metabolic Panel (6/2/18 07:06)


Magnesium (Mg) (6/2/18 07:06)


Prothrombin Time / Inr (Pt) (6/2/18 07:06)


Act Partial Throm Time (Ptt) (6/2/18 07:06)


Troponin I (6/2/18 07:06)


Lipase (6/2/18 07:06)


Chest, Single Ap (6/2/18 07:06)


Ecg Monitoring (6/2/18 07:06)


Iv Access Insert/Monitor (6/2/18 07:06)


Oximetry (6/2/18 07:06)


Sodium Chloride 0.9% Flush (Ns Flush) (6/2/18 07:15)


Dextrose 5% In [place]... W/Amiodarone Inj (6/2/18 10:14)


Admit Order (Ed Use Only) (6/2/18 10:53)





Labs





Laboratory Tests








Test


  6/2/18


07:10


 


White Blood Count 10.5 TH/MM3 


 


Red Blood Count 4.01 MIL/MM3 


 


Hemoglobin 13.1 GM/DL 


 


Hematocrit 38.8 % 


 


Mean Corpuscular Volume 96.6 FL 


 


Mean Corpuscular Hemoglobin 32.6 PG 


 


Mean Corpuscular Hemoglobin


Concent 33.8 % 


 


 


Red Cell Distribution Width 13.8 % 


 


Platelet Count 126 TH/MM3 


 


Mean Platelet Volume 9.5 FL 


 


Neutrophils (%) (Auto) 83.7 % 


 


Lymphocytes (%) (Auto) 9.9 % 


 


Monocytes (%) (Auto) 5.4 % 


 


Eosinophils (%) (Auto) 0.6 % 


 


Basophils (%) (Auto) 0.4 % 


 


Neutrophils # (Auto) 8.8 TH/MM3 


 


Lymphocytes # (Auto) 1.0 TH/MM3 


 


Monocytes # (Auto) 0.6 TH/MM3 


 


Eosinophils # (Auto) 0.1 TH/MM3 


 


Basophils # (Auto) 0.0 TH/MM3 


 


CBC Comment DIFF FINAL 


 


Differential Comment  


 


Prothrombin Time 10.4 SEC 


 


Prothromb Time International


Ratio 1.0 RATIO 


 


 


Activated Partial


Thromboplast Time 26.2 SEC 


 


 


Blood Urea Nitrogen 16 MG/DL 


 


Creatinine 0.88 MG/DL 


 


Random Glucose 120 MG/DL 


 


Total Protein 5.8 GM/DL 


 


Albumin 3.2 GM/DL 


 


Calcium Level 8.0 MG/DL 


 


Magnesium Level 2.0 MG/DL 


 


Alkaline Phosphatase 70 U/L 


 


Aspartate Amino Transf


(AST/SGOT) 22 U/L 


 


 


Alanine Aminotransferase


(ALT/SGPT) 31 U/L 


 


 


Total Bilirubin 0.6 MG/DL 


 


Sodium Level 143 MEQ/L 


 


Potassium Level 3.5 MEQ/L 


 


Chloride Level 108 MEQ/L 


 


Carbon Dioxide Level 23.6 MEQ/L 


 


Anion Gap 11 MEQ/L 


 


Estimat Glomerular Filtration


Rate 61 ML/MIN 


 


 


Total Creatine Kinase 56 U/L 


 


Troponin I 0.32 NG/ML 


 


B-Type Natriuretic Peptide 726 PG/ML 


 


Lipase 116 U/L 











MDM


Medical Decision Making


Medical Screen Exam Complete:  Yes


Emergency Medical Condition:  Yes


Medical Record Reviewed:  Yes


Interpretation(s)


Normal sinus rhythm with PVC, 96 bpm,, ST depressions on 1 and aVL but without 

any concomitant ST elevations.


Differential Diagnosis


AICD in appropriate versus appropriate firing versus STEMI versus non-STEMI 

versus electrolyte abnormalities


Narrative Course


At approximately 713 according to her monitor the patient went into V. tach and 

had her AICD fire appropriately after which she was back in sinus rhythm with 

PVCs





Diagnosis





 Primary Impression:  


 Status post AICD firing





Admitting Information


Admitting Physician Requests:  Observation











Alvaro Mancera MD Jun 2, 2018 07:16

## 2018-06-02 NOTE — RADRPT
EXAM DATE:  6/2/2018 7:44 AM EDT

AGE/SEX:        84 years / Female



INDICATIONS:  Pacemaker and Defibrillator firing/



CLINICAL DATA:  This is the patient's initial encounter. Patient reports that signs and symptoms have
 been present for 1 day and indicates a pain score of 4/10. 

                                                                          

MEDICAL/SURGICAL HISTORY:       Cardiovascular disease. Myocardial Infarction  CABG.  Pacemaker.



COMPARISON:      [facility] [place], CHEST SINGLE AP, 5/23/2018.  .



FINDINGS:  

A single AP view of the chest demonstrates the lungs to be symmetrically aerated without evidence of 
mass, infiltrate or effusion. Stable cardiomegaly with single lead AICD. Multiple intact median peralta
otomy wires. Pulmonary vasculature is normal. Osseous structures are intact.  





CONCLUSION: 

Negative examination.



Electronically signed by: Ana Davidson MD  6/2/2018 7:49 AM EDT

## 2018-06-03 VITALS
DIASTOLIC BLOOD PRESSURE: 71 MMHG | OXYGEN SATURATION: 98 % | RESPIRATION RATE: 16 BRPM | SYSTOLIC BLOOD PRESSURE: 124 MMHG | HEART RATE: 61 BPM

## 2018-06-03 VITALS — HEART RATE: 92 BPM

## 2018-06-03 VITALS
TEMPERATURE: 98.2 F | DIASTOLIC BLOOD PRESSURE: 65 MMHG | OXYGEN SATURATION: 96 % | RESPIRATION RATE: 18 BRPM | SYSTOLIC BLOOD PRESSURE: 111 MMHG | HEART RATE: 62 BPM

## 2018-06-03 VITALS
SYSTOLIC BLOOD PRESSURE: 122 MMHG | HEART RATE: 63 BPM | RESPIRATION RATE: 16 BRPM | DIASTOLIC BLOOD PRESSURE: 72 MMHG | OXYGEN SATURATION: 97 %

## 2018-06-03 VITALS — HEART RATE: 68 BPM

## 2018-06-03 VITALS — HEART RATE: 62 BPM

## 2018-06-03 VITALS
OXYGEN SATURATION: 97 % | DIASTOLIC BLOOD PRESSURE: 71 MMHG | TEMPERATURE: 98.6 F | RESPIRATION RATE: 16 BRPM | SYSTOLIC BLOOD PRESSURE: 117 MMHG | HEART RATE: 65 BPM

## 2018-06-03 VITALS — HEART RATE: 60 BPM

## 2018-06-03 VITALS
HEART RATE: 84 BPM | TEMPERATURE: 97.9 F | OXYGEN SATURATION: 96 % | DIASTOLIC BLOOD PRESSURE: 77 MMHG | SYSTOLIC BLOOD PRESSURE: 130 MMHG | RESPIRATION RATE: 18 BRPM

## 2018-06-03 VITALS — HEART RATE: 88 BPM

## 2018-06-03 VITALS — HEART RATE: 58 BPM

## 2018-06-03 VITALS — HEART RATE: 64 BPM

## 2018-06-03 VITALS — HEART RATE: 100 BPM

## 2018-06-03 VITALS — HEART RATE: 61 BPM

## 2018-06-03 VITALS — HEART RATE: 70 BPM

## 2018-06-03 VITALS
OXYGEN SATURATION: 97 % | RESPIRATION RATE: 20 BRPM | TEMPERATURE: 98 F | DIASTOLIC BLOOD PRESSURE: 78 MMHG | HEART RATE: 58 BPM | SYSTOLIC BLOOD PRESSURE: 128 MMHG

## 2018-06-03 VITALS — HEART RATE: 89 BPM

## 2018-06-03 VITALS — HEART RATE: 71 BPM

## 2018-06-03 LAB
BASOPHILS # BLD AUTO: 0.1 TH/MM3 (ref 0–0.2)
BASOPHILS NFR BLD: 0.6 % (ref 0–2)
BUN SERPL-MCNC: 12 MG/DL (ref 7–18)
CALCIUM SERPL-MCNC: 8.6 MG/DL (ref 8.5–10.1)
CHLORIDE SERPL-SCNC: 109 MEQ/L (ref 98–107)
CREAT SERPL-MCNC: 0.91 MG/DL (ref 0.5–1)
EOSINOPHIL # BLD: 0.1 TH/MM3 (ref 0–0.4)
EOSINOPHIL NFR BLD: 1.1 % (ref 0–4)
ERYTHROCYTE [DISTWIDTH] IN BLOOD BY AUTOMATED COUNT: 14 % (ref 11.6–17.2)
GFR SERPLBLD BASED ON 1.73 SQ M-ARVRAT: 59 ML/MIN (ref 89–?)
GLUCOSE SERPL-MCNC: 106 MG/DL (ref 74–106)
HCO3 BLD-SCNC: 24.4 MEQ/L (ref 21–32)
HCT VFR BLD CALC: 35.9 % (ref 35–46)
HGB BLD-MCNC: 12.2 GM/DL (ref 11.6–15.3)
LYMPHOCYTES # BLD AUTO: 1.4 TH/MM3 (ref 1–4.8)
LYMPHOCYTES NFR BLD AUTO: 15.1 % (ref 9–44)
MCH RBC QN AUTO: 32.6 PG (ref 27–34)
MCHC RBC AUTO-ENTMCNC: 33.9 % (ref 32–36)
MCV RBC AUTO: 95.9 FL (ref 80–100)
MONOCYTE #: 0.6 TH/MM3 (ref 0–0.9)
MONOCYTES NFR BLD: 6.2 % (ref 0–8)
NEUTROPHILS # BLD AUTO: 7.3 TH/MM3 (ref 1.8–7.7)
NEUTROPHILS NFR BLD AUTO: 77 % (ref 16–70)
PLATELET # BLD: 132 TH/MM3 (ref 150–450)
PMV BLD AUTO: 9.6 FL (ref 7–11)
RBC # BLD AUTO: 3.74 MIL/MM3 (ref 4–5.3)
SODIUM SERPL-SCNC: 143 MEQ/L (ref 136–145)
WBC # BLD AUTO: 9.5 TH/MM3 (ref 4–11)

## 2018-06-03 RX ADMIN — AMIODARONE HYDROCHLORIDE PRN MLS/HR: 50 INJECTION, SOLUTION INTRAVENOUS at 21:11

## 2018-06-03 RX ADMIN — PRAVASTATIN SODIUM SCH MG: 20 TABLET ORAL at 21:07

## 2018-06-03 RX ADMIN — ENOXAPARIN SODIUM SCH MG: 40 INJECTION SUBCUTANEOUS at 12:21

## 2018-06-03 RX ADMIN — VALSARTAN SCH MG: 80 TABLET ORAL at 09:11

## 2018-06-03 RX ADMIN — AMIODARONE HYDROCHLORIDE PRN MLS/HR: 50 INJECTION, SOLUTION INTRAVENOUS at 16:20

## 2018-06-03 RX ADMIN — METOPROLOL TARTRATE SCH MG: 25 TABLET, FILM COATED ORAL at 17:20

## 2018-06-03 RX ADMIN — AMIODARONE HYDROCHLORIDE SCH MG: 200 TABLET ORAL at 17:20

## 2018-06-03 RX ADMIN — METOPROLOL TARTRATE SCH MG: 25 TABLET, FILM COATED ORAL at 12:23

## 2018-06-03 RX ADMIN — AMIODARONE HYDROCHLORIDE SCH MG: 200 TABLET ORAL at 09:11

## 2018-06-03 RX ADMIN — VALSARTAN SCH MG: 80 TABLET ORAL at 21:07

## 2018-06-03 RX ADMIN — METOPROLOL TARTRATE SCH MG: 25 TABLET, FILM COATED ORAL at 06:23

## 2018-06-03 RX ADMIN — Medication SCH ML: at 21:00

## 2018-06-03 RX ADMIN — Medication SCH ML: at 09:11

## 2018-06-03 RX ADMIN — NYSTATIN SCH APPLIC: 100000 CREAM TOPICAL at 21:00

## 2018-06-03 NOTE — HHI.PR
Subjective


Remarks


Pt seen and examined in f/u for AICD firing and VTACH. AFVSS. No acute events 

overnight. Only complaint right now is some tenderness and swelling around her 

vaginal region/inguinal folds. Denies pruritus or discharge. Otherwise feeling 

better; denies CP or SOB.





Objective





Vital Signs








  Date Time  Temp Pulse Resp B/P (MAP) Pulse Ox O2 Delivery O2 Flow Rate FiO2


 


6/3/18 10:00  68      


 


6/3/18 09:00  68      


 


6/3/18 08:00  60      


 


6/3/18 07:00 98.0 58 20 128/78 (95) 97   


 


6/3/18 07:00  91      


 


6/3/18 06:00  70      


 


6/3/18 05:00  88      


 


6/3/18 04:00  68      


 


6/3/18 03:36  61 16 124/71 (88) 98   


 


6/3/18 03:00  89      


 


6/3/18 02:00  58      


 


6/3/18 01:00  58      


 


6/3/18 00:00  62      


 


6/2/18 23:10  90 16 114/81 (92) 97   


 


6/2/18 23:00  91      


 


6/2/18 22:00  90      


 


6/2/18 21:00  62      


 


6/2/18 20:00  92      


 


6/2/18 19:35  92  120/72    


 


6/2/18 19:30 97.8 97 16 120/72 (88) 97   


 


6/2/18 19:00  93      


 


6/2/18 18:00  95      


 


6/2/18 17:30  100  132/81    


 


6/2/18 17:15  83  136/84    


 


6/2/18 17:00  90      


 


6/2/18 16:00  134      


 


6/2/18 15:13  96  145/78    


 


6/2/18 15:00 97.3 84 18 145/78 (100) 96   


 


6/2/18 15:00  82      


 


6/2/18 14:00  90      


 


6/2/18 13:00  114      


 


6/2/18 12:21  89 16 144/72 (96) 95   


 


6/2/18 12:00 98.0 114 20 149/85 (106) 100   


 


6/2/18 12:00  112      














I/O      


 


 6/2/18 6/2/18 6/2/18 6/3/18 6/3/18 6/3/18





 07:00 15:00 23:00 07:00 15:00 23:00


 


Intake Total  100 ml 750 ml 360 ml  


 


Output Total   500 ml 500 ml  


 


Balance  100 ml 250 ml -140 ml  


 


      


 


Intake Oral   400 ml 360 ml  


 


IV Total  100 ml 350 ml   


 


Output Urine Total   500 ml 500 ml  


 


# Bowel Movements    2  








Result Diagram:  


6/3/18 0352                                                                    

            6/3/18 0352





Imaging











Chest X-Ray 6/2/18 0706 Signed





Impressions: 





 CONCLUSION: 





 Negative examination.





  





 








Objective Remarks


GENERAL: Pleasant elderly  female resting comfortably in bed in NAD.


SKIN: Warm and dry.


HEENT: AT/NC. Pupils equal and round. MMM.


NECK: Supple no tender LAD or JVD.


CHEST: Median sternotomy scar.


HEART: RRR no m/r/g.


LUNGS: CTAB without wheezes or crackles.


ABDOMEN: +BS, soft, NT, ND.


: Erythematous, slightly macerated plaques with fine scaling along inguinal 

folds extending to labia majora. 


EXTREMITIES: No LE edema. 2+ pedal pulses.


NEURO: Awake and alert. Nonfocal.


PSYCH: Appropriate mood and affect.





A/P


Problem List:  


(1) Ventricular tachycardia


ICD Code:  I47.2 - Ventricular tachycardia


(2) Ischemic cardiomyopathy


ICD Code:  I25.5 - Ischemic cardiomyopathy


(3) CAD (coronary artery disease)


ICD Code:  I25.10 - Atherosclerotic heart disease of native coronary artery 

without angina pectoris


Status:  Chronic


Assessment and Plan


84-year-old  female with history of CAD, CHF, and lupus admitted on 6/ 2 after her AICD shocked her multiple times for ventricular tachycardia.





1. AICD firing, ventricular tachycardia


- AICD placed 5/22


- Cardiology consulted


- On amiodarone drip and PO


- Medications adjusted by cardiology: increased metoprolol and transitioning to 

Entresto





2. Inguinal intertrigo


- Start Nystatin BID





3. CAD, CHF


- Troponins elevated likely secondary to VTACH and AICD firing


- Recent patent grafts on cardiac cath done last month


- EF 30% from cardiac cath on 5/18


- Followed by cardiology


- Starting Entresto tomorrow


- Continue ASA and statin





4. Lupus 


- Not on medication as outpatient





DVT prophylaxis: Lovenox


Discharge Planning


Once cleared by cardiology











Preethi Forbes MD Jose Miguel 3, 2018 11:29

## 2018-06-03 NOTE — PD.CARD.PN
Subjective


Subjective Remarks


slept well, no complaints





Objective


Medications





Current Medications








 Medications


  (Trade)  Dose


 Ordered  Sig/Ag


 Route  Start Time


 Stop Time Status Last Admin


 


  (NS Flush)  2 ml  UNSCH  PRN


 IV FLUSH  6/2/18 11:00


     


 


 


  (NS Flush)  2 ml  BID


 IV FLUSH  6/2/18 21:00


    6/2/18 20:48


 


 


  (Lovenox Inj)  40 mg  Q24H


 SQ  6/2/18 12:00


    6/2/18 12:00


 


 


  (Narcan Inj)  0.4 mg  UNSCH  PRN


 IV PUSH  6/2/18 11:00


     


 


 


 Amiodarone HCl


 450 mg/Sodium


 Chloride  250 ml @ 


 33.33 mls/


 hr  Q7H31M PRN


 IV  6/2/18 14:45


    6/2/18 19:35


 


 


  (Norco  5-325 Mg)  1 tab  Q6HR  PRN


 PO  6/2/18 15:00


    6/2/18 16:08


 


 


  (Pravachol)  20 mg  HS


 PO  6/2/18 21:00


    6/2/18 20:48


 


 


  (Diovan)  80 mg  BID


 PO  6/2/18 21:00


 6/3/18 23:00  6/2/18 20:48


 


 


  (Lopressor)  25 mg  Q6HR


 PO  6/2/18 18:00


    6/2/18 23:46


 


 


  (Entresto 24-26


 Mg)  1 tab  BID


 PO  6/4/18 09:00


   UNV  


 


 


  (Cordarone)  400 mg  BIDPC


 PO  6/3/18 09:00


   UNV  


 


 


  (KCl)  20 meq  ONCE  ONCE


 PO  6/3/18 06:00


 6/3/18 06:01 UNV  


 


 


  (KCl)  20 meq  ONCE  ONCE


 PO  6/3/18 13:00


 6/3/18 13:01 UNV  


 








Vital Signs / I&O





Vital Signs








  Date Time  Temp Pulse Resp B/P (MAP) Pulse Ox O2 Delivery O2 Flow Rate FiO2


 


6/3/18 03:36  61 16 124/71 (88) 98   


 


6/3/18 03:00  89      


 


6/3/18 02:00  58      


 


6/3/18 01:00  58      


 


6/3/18 00:00  62      


 


6/2/18 23:10  90 16 114/81 (92) 97   


 


6/2/18 23:00  91      


 


6/2/18 22:00  90      


 


6/2/18 21:00  62      


 


6/2/18 20:00  92      


 


6/2/18 19:35  92  120/72    


 


6/2/18 19:30 97.8 97 16 120/72 (88) 97   


 


6/2/18 19:00  93      


 


6/2/18 18:00  95      


 


6/2/18 17:30  100  132/81    


 


6/2/18 17:15  83  136/84    


 


6/2/18 17:00  90      


 


6/2/18 16:00  134      


 


6/2/18 15:13  96  145/78    


 


6/2/18 15:00 97.3 84 18 145/78 (100) 96   


 


6/2/18 15:00  82      


 


6/2/18 14:00  90      


 


6/2/18 13:00  114      


 


6/2/18 12:21  89 16 144/72 (96) 95   


 


6/2/18 12:00 98.0 114 20 149/85 (106) 100   


 


6/2/18 12:00  112      


 


6/2/18 11:01  94 16 133/64 (87) 95 Room Air  


 


6/2/18 10:35  120  134/61    


 


6/2/18 09:30  89 16 135/69 (91) 98 Room Air  


 


6/2/18 07:22     96   


 


6/2/18 07:13 98.3 92 16 136/67 (90) 97 Room Air  


 


6/2/18 07:13  92 16  97 Room Air  


 


6/2/18 07:06 98.3 89 16 147/77 (100) 96   














I/O      


 


 6/2/18 6/2/18 6/2/18 6/3/18 6/3/18 6/3/18





 07:00 15:00 23:00 07:00 15:00 23:00


 


Intake Total  100 ml 750 ml   


 


Output Total   500 ml   


 


Balance  100 ml 250 ml   


 


      


 


Intake Oral   400 ml   


 


IV Total  100 ml 350 ml   


 


Output Urine Total   500 ml   








Physical Exam


Alert


No JVD


Chest: trace basilar crackles


CV S1S2 RRR


no edema


AICD site looks good


Laboratory





Laboratory Tests








Test


  6/2/18


07:10 6/2/18


11:51 6/2/18


15:15 6/2/18


19:56


 


White Blood Count 10.5 TH/MM3    


 


Red Blood Count 4.01 MIL/MM3    


 


Hemoglobin 13.1 GM/DL    


 


Hematocrit 38.8 %    


 


Mean Corpuscular Volume 96.6 FL    


 


Mean Corpuscular Hemoglobin 32.6 PG    


 


Mean Corpuscular Hemoglobin


Concent 33.8 % 


  


  


  


 


 


Red Cell Distribution Width 13.8 %    


 


Platelet Count 126 TH/MM3    


 


Mean Platelet Volume 9.5 FL    


 


Neutrophils (%) (Auto) 83.7 %    


 


Lymphocytes (%) (Auto) 9.9 %    


 


Monocytes (%) (Auto) 5.4 %    


 


Eosinophils (%) (Auto) 0.6 %    


 


Basophils (%) (Auto) 0.4 %    


 


Neutrophils # (Auto) 8.8 TH/MM3    


 


Lymphocytes # (Auto) 1.0 TH/MM3    


 


Monocytes # (Auto) 0.6 TH/MM3    


 


Eosinophils # (Auto) 0.1 TH/MM3    


 


Basophils # (Auto) 0.0 TH/MM3    


 


CBC Comment DIFF FINAL    


 


Differential Comment     


 


Prothrombin Time 10.4 SEC    


 


Prothromb Time International


Ratio 1.0 RATIO 


  


  


  


 


 


Activated Partial


Thromboplast Time 26.2 SEC 


  


  


  


 


 


Blood Urea Nitrogen 16 MG/DL    


 


Creatinine 0.88 MG/DL    


 


Random Glucose 120 MG/DL    


 


Total Protein 5.8 GM/DL    


 


Albumin 3.2 GM/DL    


 


Calcium Level 8.0 MG/DL    


 


Magnesium Level 2.0 MG/DL    


 


Alkaline Phosphatase 70 U/L    


 


Aspartate Amino Transf


(AST/SGOT) 22 U/L 


  


  


  


 


 


Alanine Aminotransferase


(ALT/SGPT) 31 U/L 


  


  


  


 


 


Total Bilirubin 0.6 MG/DL    


 


Sodium Level 143 MEQ/L    


 


Potassium Level 3.5 MEQ/L    


 


Chloride Level 108 MEQ/L    


 


Carbon Dioxide Level 23.6 MEQ/L    


 


Anion Gap 11 MEQ/L    


 


Estimat Glomerular Filtration


Rate 61 ML/MIN 


  


  


  


 


 


Total Creatine Kinase 56 U/L    


 


Troponin I 0.32 NG/ML  0.95 NG/ML   0.89 NG/ML 


 


B-Type Natriuretic Peptide 726 PG/ML    


 


Lipase 116 U/L    


 


Urine Color   LIGHT-YELLOW  


 


Urine Turbidity   CLEAR  


 


Urine pH   6.0  


 


Urine Specific Gravity   1.007  


 


Urine Protein   NEG mg/dL  


 


Urine Glucose (UA)   NEG mg/dL  


 


Urine Ketones   10 mg/dL  


 


Urine Occult Blood   NEG  


 


Urine Nitrite   NEG  


 


Urine Bilirubin   NEG  


 


Urine Urobilinogen   2.0 MG/DL  


 


Urine Leukocyte Esterase   NEG  


 


Urine RBC


  


  


  LESS THAN 1


/hpf 


 


 


Urine WBC   1 /hpf  


 


Urine Squamous Epithelial


Cells 


  


  1 /hpf 


  


 


 


Urine Mucus   FEW /lpf  


 


Microscopic Urinalysis Comment


  


  


  CULT NOT


INDICATED 


 


 


Test


  6/3/18


03:52 


  


  


 


 


White Blood Count 9.5 TH/MM3    


 


Red Blood Count 3.74 MIL/MM3    


 


Hemoglobin 12.2 GM/DL    


 


Hematocrit 35.9 %    


 


Mean Corpuscular Volume 95.9 FL    


 


Mean Corpuscular Hemoglobin 32.6 PG    


 


Mean Corpuscular Hemoglobin


Concent 33.9 % 


  


  


  


 


 


Red Cell Distribution Width 14.0 %    


 


Platelet Count 132 TH/MM3    


 


Mean Platelet Volume 9.6 FL    


 


Neutrophils (%) (Auto) 77.0 %    


 


Lymphocytes (%) (Auto) 15.1 %    


 


Monocytes (%) (Auto) 6.2 %    


 


Eosinophils (%) (Auto) 1.1 %    


 


Basophils (%) (Auto) 0.6 %    


 


Neutrophils # (Auto) 7.3 TH/MM3    


 


Lymphocytes # (Auto) 1.4 TH/MM3    


 


Monocytes # (Auto) 0.6 TH/MM3    


 


Eosinophils # (Auto) 0.1 TH/MM3    


 


Basophils # (Auto) 0.1 TH/MM3    


 


CBC Comment DIFF FINAL    


 


Differential Comment     


 


Blood Urea Nitrogen 12 MG/DL    


 


Creatinine 0.91 MG/DL    


 


Random Glucose 106 MG/DL    


 


Calcium Level 8.6 MG/DL    


 


Sodium Level 143 MEQ/L    


 


Potassium Level 3.6 MEQ/L    


 


Chloride Level 109 MEQ/L    


 


Carbon Dioxide Level 24.4 MEQ/L    


 


Anion Gap 10 MEQ/L    


 


Estimat Glomerular Filtration


Rate 59 ML/MIN 


  


  


  


 








Imaging





Last 24 hours Impressions








Chest X-Ray 6/2/18 0706 Signed





Impressions: 





 CONCLUSION: 





 Negative examination.





  





 











Assessment and Plan


Problem List:  


(1) Ischemic cardiomyopathy


ICD Codes:  I25.5 - Ischemic cardiomyopathy


(2) CAD (coronary artery disease)


ICD Codes:  I25.10 - Atherosclerotic heart disease of native coronary artery 

without angina pectoris


Status:  Chronic


Plan:  recent cath patent grafts





(3) Ventricular tachycardia


ICD Codes:  I47.2 - Ventricular tachycardia


Plan:  start PO Amio on top of IV





(4) S/P implantation of automatic cardioverter/defibrillator (AICD)


ICD Codes:  Z95.810 - Presence of automatic (implantable) cardiac defibrillator


Plan:  nl function





(5) Chronic systolic congestive heart failure, NYHA class 3


ICD Codes:  I50.22 - Chronic systolic (congestive) heart failure


Plan:  transition from ACEI to Entresto





(6) Elevated troponin


ICD Codes:  R74.8 - Abnormal levels of other serum enzymes


Plan:  secondary to AICD shocks. Doubt ACS














Thaddeus Lobo MD Jose [facility] 3, 2018 06:00

## 2018-06-03 NOTE — EKG
Date Performed: 06/02/2018       Time Performed: 15:25:44

 

PTAGE:      84 years

 

EKG:      Ectopic atrial rhythm with premature supraventricular beats Left axis deviation Inferior in
farct - age undetermined Nonspecific ST-T change Abnormal ECG

 

PREVIOUS TRACING       : 06/02/2018 07.07 Since previous tracing, no significant change.

 

DOCTOR:   Ranulfo Cuevas  Interpretating Date/Time  06/03/2018 15:59:32

## 2018-06-04 VITALS — HEART RATE: 60 BPM

## 2018-06-04 VITALS — HEART RATE: 58 BPM

## 2018-06-04 VITALS
TEMPERATURE: 97.2 F | SYSTOLIC BLOOD PRESSURE: 126 MMHG | OXYGEN SATURATION: 96 % | HEART RATE: 63 BPM | RESPIRATION RATE: 18 BRPM | DIASTOLIC BLOOD PRESSURE: 71 MMHG

## 2018-06-04 VITALS — HEART RATE: 59 BPM

## 2018-06-04 VITALS — HEART RATE: 54 BPM

## 2018-06-04 VITALS — HEART RATE: 61 BPM

## 2018-06-04 VITALS
SYSTOLIC BLOOD PRESSURE: 129 MMHG | HEART RATE: 58 BPM | DIASTOLIC BLOOD PRESSURE: 82 MMHG | RESPIRATION RATE: 16 BRPM | OXYGEN SATURATION: 97 % | TEMPERATURE: 98.3 F

## 2018-06-04 VITALS
RESPIRATION RATE: 16 BRPM | SYSTOLIC BLOOD PRESSURE: 108 MMHG | OXYGEN SATURATION: 97 % | HEART RATE: 76 BPM | DIASTOLIC BLOOD PRESSURE: 69 MMHG

## 2018-06-04 VITALS
TEMPERATURE: 98.1 F | OXYGEN SATURATION: 96 % | RESPIRATION RATE: 16 BRPM | SYSTOLIC BLOOD PRESSURE: 131 MMHG | DIASTOLIC BLOOD PRESSURE: 83 MMHG | HEART RATE: 93 BPM

## 2018-06-04 VITALS
TEMPERATURE: 98.1 F | DIASTOLIC BLOOD PRESSURE: 71 MMHG | SYSTOLIC BLOOD PRESSURE: 128 MMHG | OXYGEN SATURATION: 99 % | RESPIRATION RATE: 16 BRPM | HEART RATE: 62 BPM

## 2018-06-04 VITALS — HEART RATE: 66 BPM

## 2018-06-04 VITALS — HEART RATE: 64 BPM

## 2018-06-04 VITALS — HEART RATE: 94 BPM

## 2018-06-04 VITALS — HEART RATE: 53 BPM

## 2018-06-04 VITALS
TEMPERATURE: 97.9 F | SYSTOLIC BLOOD PRESSURE: 126 MMHG | DIASTOLIC BLOOD PRESSURE: 79 MMHG | OXYGEN SATURATION: 99 % | HEART RATE: 61 BPM | RESPIRATION RATE: 18 BRPM

## 2018-06-04 VITALS — HEART RATE: 68 BPM

## 2018-06-04 VITALS — HEART RATE: 62 BPM

## 2018-06-04 VITALS — HEART RATE: 92 BPM

## 2018-06-04 VITALS — HEART RATE: 56 BPM

## 2018-06-04 LAB
BUN SERPL-MCNC: 12 MG/DL (ref 7–18)
CALCIUM SERPL-MCNC: 8.4 MG/DL (ref 8.5–10.1)
CHLORIDE SERPL-SCNC: 111 MEQ/L (ref 98–107)
CREAT SERPL-MCNC: 0.85 MG/DL (ref 0.5–1)
GFR SERPLBLD BASED ON 1.73 SQ M-ARVRAT: 64 ML/MIN (ref 89–?)
GLUCOSE SERPL-MCNC: 115 MG/DL (ref 74–106)
HCO3 BLD-SCNC: 24.3 MEQ/L (ref 21–32)
MAGNESIUM SERPL-MCNC: 2 MG/DL (ref 1.5–2.5)
SODIUM SERPL-SCNC: 144 MEQ/L (ref 136–145)

## 2018-06-04 RX ADMIN — METOPROLOL TARTRATE SCH MG: 25 TABLET, FILM COATED ORAL at 06:07

## 2018-06-04 RX ADMIN — ASPIRIN SCH MG: 325 TABLET, DELAYED RELEASE ORAL at 08:57

## 2018-06-04 RX ADMIN — SACUBITRIL AND VALSARTAN SCH TAB: 24; 26 TABLET, FILM COATED ORAL at 08:56

## 2018-06-04 RX ADMIN — NYSTATIN SCH APPLIC: 100000 CREAM TOPICAL at 21:24

## 2018-06-04 RX ADMIN — PRAVASTATIN SODIUM SCH MG: 20 TABLET ORAL at 21:20

## 2018-06-04 RX ADMIN — Medication SCH ML: at 21:19

## 2018-06-04 RX ADMIN — METOPROLOL TARTRATE SCH MG: 25 TABLET, FILM COATED ORAL at 17:19

## 2018-06-04 RX ADMIN — SACUBITRIL AND VALSARTAN SCH TAB: 24; 26 TABLET, FILM COATED ORAL at 21:20

## 2018-06-04 RX ADMIN — METOPROLOL TARTRATE SCH MG: 25 TABLET, FILM COATED ORAL at 00:19

## 2018-06-04 RX ADMIN — ENOXAPARIN SODIUM SCH MG: 40 INJECTION SUBCUTANEOUS at 13:44

## 2018-06-04 RX ADMIN — AMIODARONE HYDROCHLORIDE SCH MG: 200 TABLET ORAL at 17:19

## 2018-06-04 RX ADMIN — Medication SCH ML: at 08:57

## 2018-06-04 RX ADMIN — METOPROLOL TARTRATE SCH MG: 25 TABLET, FILM COATED ORAL at 13:45

## 2018-06-04 RX ADMIN — AMIODARONE HYDROCHLORIDE SCH MG: 200 TABLET ORAL at 08:56

## 2018-06-04 RX ADMIN — NYSTATIN SCH APPLIC: 100000 CREAM TOPICAL at 08:59

## 2018-06-04 RX ADMIN — METOPROLOL TARTRATE SCH MG: 25 TABLET, FILM COATED ORAL at 23:11

## 2018-06-04 NOTE — PD.CARD.PN
Subjective


Subjective Remarks


slept well, no complaints, no angina





Objective


Medications





Current Medications








 Medications


  (Trade)  Dose


 Ordered  Sig/Ag


 Route  Start Time


 Stop Time Status Last Admin


 


  (NS Flush)  2 ml  UNSCH  PRN


 IV FLUSH  6/2/18 11:00


     


 


 


  (NS Flush)  2 ml  BID


 IV FLUSH  6/2/18 21:00


    6/4/18 08:57


 


 


  (Lovenox Inj)  40 mg  Q24H


 SQ  6/2/18 12:00


    6/3/18 12:21


 


 


  (Narcan Inj)  0.4 mg  UNSCH  PRN


 IV PUSH  6/2/18 11:00


     


 


 


 Amiodarone HCl


 450 mg/Sodium


 Chloride  250 ml @ 


 33.33 mls/


 hr  Q7H31M PRN


 IV  6/2/18 14:45


    6/3/18 21:11


 


 


  (Norco  5-325 Mg)  1 tab  Q6HR  PRN


 PO  6/2/18 15:00


    6/2/18 16:08


 


 


  (Pravachol)  20 mg  HS


 PO  6/2/18 21:00


    6/3/18 21:07


 


 


  (Lopressor)  25 mg  Q6HR


 PO  6/2/18 18:00


    6/4/18 06:07


 


 


  (Entresto 24-26


 Mg)  1 tab  BID


 PO  6/4/18 09:00


    6/4/18 08:56


 


 


  (Cordarone)  400 mg  BIDPC


 PO  6/3/18 09:00


    6/4/18 08:56


 


 


  (Mycostatin


 Cream)  1 applic  Q12HR


 TOPICAL  6/3/18 21:00


    6/4/18 08:59


 


 


  (Ecotrin Ec)  325 mg  DAILY


 PO  6/4/18 09:00


    6/4/18 08:57


 








Vital Signs / I&O





Vital Signs








  Date Time  Temp Pulse Resp B/P (MAP) Pulse Ox O2 Delivery O2 Flow Rate FiO2


 


6/4/18 10:00  60      


 


6/4/18 09:00  62      


 


6/4/18 08:17 98.3 58 16 129/82 (98) 97   


 


6/4/18 08:00  54      


 


6/4/18 07:00  53      


 


6/4/18 06:00  61      


 


6/4/18 05:00  58      


 


6/4/18 04:00  58      


 


6/4/18 03:46  76 16 108/69 (82) 97   


 


6/4/18 03:00  53      


 


6/4/18 02:00  92      


 


6/4/18 01:00  60      


 


6/4/18 00:00  94      


 


6/3/18 23:00  63 16 122/72 (89) 97   


 


6/3/18 23:00  66      


 


6/3/18 22:00  60      


 


6/3/18 21:11  65  117/71    


 


6/3/18 21:00  100      


 


6/3/18 20:00  92      


 


6/3/18 19:35 98.6 65 16 117/71 (86) 97   


 


6/3/18 19:00  61      


 


6/3/18 18:00  64      


 


6/3/18 17:00  68      


 


6/3/18 16:20  64  128/78    


 


6/3/18 16:00  71      


 


6/3/18 15:28 98.2 62 18 111/65 (80) 96   


 


6/3/18 15:00  92      


 


6/3/18 14:00  60      


 


6/3/18 13:00  70      


 


6/3/18 12:00  60      














I/O      


 


 6/3/18 6/3/18 6/3/18 6/4/18 6/4/18 6/4/18





 07:00 15:00 23:00 07:00 15:00 23:00


 


Intake Total 360 ml  1450 ml 480 ml  


 


Output Total 500 ml  1200 ml 1000 ml  


 


Balance -140 ml  250 ml -520 ml  


 


      


 


Intake Oral 360 ml  1200 ml 480 ml  


 


IV Total   250 ml   


 


Output Urine Total 500 ml  1200 ml 1000 ml  


 


# Bowel Movements 2  1 0  








Physical Exam


Alert


No JVD


Chest: clear


CV S1S2 RRR


no edema


AICD site looks gOOD


Tele: no VT


Laboratory





Laboratory Tests








Test


  6/4/18


06:03


 


Blood Urea Nitrogen 12 MG/DL 


 


Creatinine 0.85 MG/DL 


 


Random Glucose 115 MG/DL 


 


Calcium Level 8.4 MG/DL 


 


Magnesium Level 2.0 MG/DL 


 


Sodium Level 144 MEQ/L 


 


Potassium Level 4.1 MEQ/L 


 


Chloride Level 111 MEQ/L 


 


Carbon Dioxide Level 24.3 MEQ/L 


 


Anion Gap 9 MEQ/L 


 


Estimat Glomerular Filtration


Rate 64 ML/MIN 


 











Assessment and Plan


Problem List:  


(1) Ischemic cardiomyopathy


ICD Codes:  I25.5 - Ischemic cardiomyopathy


(2) CAD (coronary artery disease)


ICD Codes:  I25.10 - Atherosclerotic heart disease of native coronary artery 

without angina pectoris


Status:  Chronic


(3) Ventricular tachycardia


ICD Codes:  I47.2 - Ventricular tachycardia


(4) S/P implantation of automatic cardioverter/defibrillator (AICD)


ICD Codes:  Z95.810 - Presence of automatic (implantable) cardiac defibrillator


(5) Chronic systolic congestive heart failure, NYHA class 3


ICD Codes:  I50.22 - Chronic systolic (congestive) heart failure


(6) Elevated troponin


ICD Codes:  R74.8 - Abnormal levels of other serum enzymes


Assessment and Plan


Possible discharge tomorrow if no further VT











Thaddeus Lobo MD Jun 4, 2018 11:19

## 2018-06-04 NOTE — HHI.PR
Subjective


Remarks


Follow-up for ventricular tachycardia.  AICD activation.  Patient is currently 

doing well.  No further AICD firing.  No chest pain, shortness of breath, fever 

or chills.  However she does complain of anticipation of AICD firing.





Objective


Vitals





Vital Signs








  Date Time  Temp Pulse Resp B/P (MAP) Pulse Ox O2 Delivery O2 Flow Rate FiO2


 


6/4/18 15:00  64      


 


6/4/18 15:00 98.1 62 16 128/71 (90) 99   


 


6/4/18 14:00  60      


 


6/4/18 13:00  60      


 


6/4/18 12:00  66      


 


6/4/18 11:00 98.1 64 16 131/83 (99) 96   


 


6/4/18 11:00  93      


 


6/4/18 10:00  60      


 


6/4/18 09:00  62      


 


6/4/18 08:17 98.3 58 16 129/82 (98) 97   


 


6/4/18 08:00  54      


 


6/4/18 07:00  53      


 


6/4/18 06:00  61      


 


6/4/18 05:00  58      


 


6/4/18 04:00  58      


 


6/4/18 03:46  76 16 108/69 (82) 97   


 


6/4/18 03:00  53      


 


6/4/18 02:00  92      


 


6/4/18 01:00  60      


 


6/4/18 00:00  94      


 


6/3/18 23:00  63 16 122/72 (89) 97   


 


6/3/18 23:00  66      


 


6/3/18 22:00  60      


 


6/3/18 21:11  65  117/71    


 


6/3/18 21:00  100      


 


6/3/18 20:00  92      


 


6/3/18 19:35 98.6 65 16 117/71 (86) 97   


 


6/3/18 19:00  61      


 


6/3/18 18:00  64      














I/O      


 


 6/3/18 6/3/18 6/3/18 6/4/18 6/4/18 6/4/18





 07:00 15:00 23:00 07:00 15:00 23:00


 


Intake Total 360 ml  1450 ml 480 ml  


 


Output Total 500 ml  1200 ml 1000 ml  


 


Balance -140 ml  250 ml -520 ml  


 


      


 


Intake Oral 360 ml  1200 ml 480 ml  


 


IV Total   250 ml   


 


Output Urine Total 500 ml  1200 ml 1000 ml  


 


# Bowel Movements 2  1 0  








Result Diagram:  


6/3/18 0352                                                                    

            6/4/18 0603





Imaging





Last Impressions








Chest X-Ray 6/2/18 0706 Signed





Impressions: 





 CONCLUSION: 





 Negative examination.





  





 








Objective Remarks


GENERAL: Alert, oriented 3, NAD.


SKIN: Warm and dry.


HEAD: Normocephalic.


EYES: No scleral icterus. No injection or drainage. 


NECK: Supple, trachea midline. No JVD or lymphadenopathy.


CARDIOVASCULAR: Regular rate and rhythm without murmurs, gallops, or rubs. 


RESPIRATORY: Breath sounds equal bilaterally. No accessory muscle use.


GASTROINTESTINAL: Abdomen soft, non-tender, nondistended. 


MUSCULOSKELETAL: No cyanosis, or edema. 


BACK: Nontender without obvious deformity. No CVA tenderness.





Procedures


None





A/P


Problem List:  


(1) Ventricular tachycardia


ICD Code:  I47.2 - Ventricular tachycardia


Assessment and Plan


84-year-old  female with history of CAD, CHF, and lupus admitted on 6/ 2 after her AICD shocked her multiple times for ventricular tachycardia.





1. AICD firing, ventricular tachycardia


- AICD placed 5/22


- Cardiology consulted


- Finished Amiodarone drip and currently on PO Amiodarone. 


- Medications adjusted by cardiology: increased metoprolol and transitioning to 

Entresto





2. CAD, CHF


- Troponins elevated likely secondary to VTACH and AICD firing


- Recent patent grafts on cardiac cath done last month


- EF 30% from cardiac cath on 5/18


- Followed by cardiology


- Continue Entresto


- Continue ASA and statin





4. Lupus 


- Not on medication as outpatient





DVT prophylaxis: Lovenox





Probable discharge home on 6/5/2018.











Carito Ribeiro DO Jun 4, 2018 5:54 pm

## 2018-06-05 VITALS — HEART RATE: 52 BPM

## 2018-06-05 VITALS — HEART RATE: 65 BPM

## 2018-06-05 VITALS
TEMPERATURE: 97.4 F | DIASTOLIC BLOOD PRESSURE: 56 MMHG | OXYGEN SATURATION: 97 % | SYSTOLIC BLOOD PRESSURE: 133 MMHG | HEART RATE: 54 BPM | RESPIRATION RATE: 16 BRPM

## 2018-06-05 VITALS — HEART RATE: 69 BPM

## 2018-06-05 VITALS
SYSTOLIC BLOOD PRESSURE: 120 MMHG | HEART RATE: 61 BPM | TEMPERATURE: 98.3 F | RESPIRATION RATE: 18 BRPM | DIASTOLIC BLOOD PRESSURE: 63 MMHG | OXYGEN SATURATION: 97 %

## 2018-06-05 VITALS
SYSTOLIC BLOOD PRESSURE: 150 MMHG | DIASTOLIC BLOOD PRESSURE: 96 MMHG | RESPIRATION RATE: 18 BRPM | OXYGEN SATURATION: 98 % | TEMPERATURE: 97.9 F | HEART RATE: 93 BPM

## 2018-06-05 VITALS
SYSTOLIC BLOOD PRESSURE: 116 MMHG | OXYGEN SATURATION: 95 % | TEMPERATURE: 98.4 F | RESPIRATION RATE: 18 BRPM | DIASTOLIC BLOOD PRESSURE: 69 MMHG | HEART RATE: 63 BPM

## 2018-06-05 VITALS — HEART RATE: 54 BPM

## 2018-06-05 VITALS — HEART RATE: 60 BPM

## 2018-06-05 VITALS
OXYGEN SATURATION: 98 % | SYSTOLIC BLOOD PRESSURE: 111 MMHG | HEART RATE: 60 BPM | TEMPERATURE: 97.7 F | RESPIRATION RATE: 16 BRPM | DIASTOLIC BLOOD PRESSURE: 64 MMHG

## 2018-06-05 VITALS
OXYGEN SATURATION: 98 % | TEMPERATURE: 97.4 F | HEART RATE: 59 BPM | DIASTOLIC BLOOD PRESSURE: 73 MMHG | SYSTOLIC BLOOD PRESSURE: 119 MMHG | RESPIRATION RATE: 16 BRPM

## 2018-06-05 VITALS — HEART RATE: 62 BPM

## 2018-06-05 VITALS — HEART RATE: 56 BPM

## 2018-06-05 VITALS — HEART RATE: 58 BPM

## 2018-06-05 VITALS — HEART RATE: 63 BPM

## 2018-06-05 VITALS — HEART RATE: 64 BPM

## 2018-06-05 VITALS — HEART RATE: 59 BPM

## 2018-06-05 VITALS — HEART RATE: 70 BPM

## 2018-06-05 VITALS — HEART RATE: 86 BPM

## 2018-06-05 RX ADMIN — AMIODARONE HYDROCHLORIDE SCH MG: 200 TABLET ORAL at 08:36

## 2018-06-05 RX ADMIN — SACUBITRIL AND VALSARTAN SCH TAB: 49; 51 TABLET, FILM COATED ORAL at 21:27

## 2018-06-05 RX ADMIN — ASPIRIN SCH MG: 325 TABLET, DELAYED RELEASE ORAL at 08:37

## 2018-06-05 RX ADMIN — Medication SCH ML: at 08:37

## 2018-06-05 RX ADMIN — METOPROLOL TARTRATE SCH MG: 25 TABLET, FILM COATED ORAL at 17:08

## 2018-06-05 RX ADMIN — Medication SCH ML: at 21:28

## 2018-06-05 RX ADMIN — NYSTATIN SCH APPLIC: 100000 CREAM TOPICAL at 21:28

## 2018-06-05 RX ADMIN — METOPROLOL TARTRATE SCH MG: 25 TABLET, FILM COATED ORAL at 11:40

## 2018-06-05 RX ADMIN — PRAVASTATIN SODIUM SCH MG: 20 TABLET ORAL at 21:27

## 2018-06-05 RX ADMIN — ENOXAPARIN SODIUM SCH MG: 40 INJECTION SUBCUTANEOUS at 11:40

## 2018-06-05 RX ADMIN — SACUBITRIL AND VALSARTAN SCH TAB: 24; 26 TABLET, FILM COATED ORAL at 08:36

## 2018-06-05 RX ADMIN — METOPROLOL TARTRATE SCH MG: 25 TABLET, FILM COATED ORAL at 06:19

## 2018-06-05 RX ADMIN — AMIODARONE HYDROCHLORIDE SCH MG: 200 TABLET ORAL at 17:09

## 2018-06-05 RX ADMIN — NYSTATIN SCH APPLIC: 100000 CREAM TOPICAL at 09:00

## 2018-06-05 NOTE — HHI.PR
Subjective


Remarks


Follow-up for ventricular tachycardia, AICD activation. No further AICD events. 

No CP, SOB, fever, chills. Patient was discharged today. However, we are 

waiting for insurance authorization.





Objective


Vitals





Vital Signs








  Date Time  Temp Pulse Resp B/P (MAP) Pulse Ox O2 Delivery O2 Flow Rate FiO2


 


6/5/18 17:00  69      


 


6/5/18 16:00  58      


 


6/5/18 15:00  63      


 


6/5/18 15:00 97.7 60 16 111/64 (80) 98   


 


6/5/18 14:00  60      


 


6/5/18 13:00  58      


 


6/5/18 12:00  56      


 


6/5/18 11:00 97.4 58 16 119/73 (88) 98   


 


6/5/18 11:00  59      


 


6/5/18 10:00  70      


 


6/5/18 09:00  64      


 


6/5/18 08:00  52      


 


6/5/18 07:00 97.4 54 16 133/56 (81) 97   


 


6/5/18 07:00  63      


 


6/5/18 06:37  63      


 


6/5/18 05:00  56      


 


6/5/18 04:00  54      


 


6/5/18 03:00  76      


 


6/5/18 03:00 98.4 63 18 116/69 (85) 95   


 


6/5/18 02:00  54      


 


6/5/18 01:00  52      


 


6/5/18 00:00  52      


 


6/4/18 23:00 97.2 65 18 126/71 (89) 96   


 


6/4/18 23:00  63      


 


6/4/18 22:00  56      


 


6/4/18 21:00  68      


 


6/4/18 20:22 97.9 61 18 126/79 (95) 99   


 


6/4/18 20:00  58      


 


6/4/18 19:00  59      


 


6/4/18 18:00  58      














I/O      


 


 6/4/18 6/4/18 6/4/18 6/5/18 6/5/18 6/5/18





 07:00 15:00 23:00 07:00 15:00 23:00


 


Intake Total 480 ml  720 ml 720 ml  720 ml


 


Output Total 1000 ml   1450 ml  900 ml


 


Balance -520 ml  720 ml -730 ml  -180 ml


 


      


 


Intake Oral 480 ml  720 ml 720 ml  720 ml


 


Output Urine Total 1000 ml   1450 ml  900 ml


 


# Voids   6   


 


# Bowel Movements 0     








Result Diagram:  


6/3/18 0352                                                                    

            6/4/18 0603





Imaging





Last Impressions








Chest X-Ray 6/2/18 0706 Signed





Impressions: 





 CONCLUSION: 





 Negative examination.





  





 








Objective Remarks


GENERAL: Alert, oriented 3, NAD.


SKIN: Warm and dry.


HEAD: Normocephalic.


EYES: No scleral icterus. No injection or drainage. 


NECK: Supple, trachea midline. No JVD or lymphadenopathy.


CARDIOVASCULAR: Regular rate and rhythm without murmurs, gallops, or rubs. 


RESPIRATORY: Breath sounds equal bilaterally. No accessory muscle use.


GASTROINTESTINAL: Abdomen soft, non-tender, nondistended. 


MUSCULOSKELETAL: No cyanosis, or edema. 


BACK: Nontender without obvious deformity. No CVA tenderness.





Procedures


None





A/P


Problem List:  


(1) Ventricular tachycardia


ICD Code:  I47.2 - Ventricular tachycardia


Assessment and Plan


84-year-old  female with history of CAD, CHF, and lupus admitted on 6/ 2 after her AICD shocked her multiple times for ventricular tachycardia.





1. AICD firing, ventricular tachycardia


- AICD placed 5/22


- Cardiology consulted


- Finished Amiodarone drip and currently on PO Amiodarone. Will continue 

Amiodarone per Cardiology recommendations. 


- Medications adjusted by cardiology: increased metoprolol and transitioning to 

Entresto





2. CAD, CHF


- Troponins elevated likely secondary to VTACH and AICD firing


- Recent patent grafts on cardiac cath done last month


- EF 30% from cardiac cath on 5/18


- Followed by cardiology


- Continue Entresto


- Continue ASA and statin





4. Lupus 


- Not on medication as outpatient





DVT prophylaxis: Lovenox





Probable discharge to SNF on 6/6/2018.











Carito Ribeiro DO Jun 5, 2018 17:41

## 2018-06-05 NOTE — PD.CARD.PN
Subjective


Subjective Remarks


slept well, no complaints, no angina





Objective


Medications





Current Medications








 Medications


  (Trade)  Dose


 Ordered  Sig/Ag


 Route  Start Time


 Stop Time Status Last Admin


 


  (NS Flush)  2 ml  UNSCH  PRN


 IV FLUSH  6/2/18 11:00


     


 


 


  (NS Flush)  2 ml  BID


 IV FLUSH  6/2/18 21:00


    6/5/18 08:37


 


 


  (Lovenox Inj)  40 mg  Q24H


 SQ  6/2/18 12:00


    6/4/18 13:44


 


 


  (Narcan Inj)  0.4 mg  UNSCH  PRN


 IV PUSH  6/2/18 11:00


     


 


 


  (Norco  5-325 Mg)  1 tab  Q6HR  PRN


 PO  6/2/18 15:00


    6/2/18 16:08


 


 


  (Pravachol)  20 mg  HS


 PO  6/2/18 21:00


    6/4/18 21:20


 


 


  (Lopressor)  25 mg  Q6HR


 PO  6/2/18 18:00


    6/5/18 06:19


 


 


  (Cordarone)  400 mg  BIDPC


 PO  6/3/18 09:00


    6/5/18 08:36


 


 


  (Mycostatin


 Cream)  1 applic  Q12HR


 TOPICAL  6/3/18 21:00


    6/4/18 21:24


 


 


  (Ecotrin Ec)  325 mg  DAILY


 PO  6/4/18 09:00


    6/5/18 08:37


 


 


  (Entresto 49-51


 Mg)  1 tab  BID


 PO  6/5/18 21:00


     


 








Vital Signs / I&O





Vital Signs








  Date Time  Temp Pulse Resp B/P (MAP) Pulse Ox O2 Delivery O2 Flow Rate FiO2


 


6/5/18 07:00 97.4 54 16 133/56 (81) 97   


 


6/5/18 06:37  63      


 


6/5/18 05:00  56      


 


6/5/18 04:00  54      


 


6/5/18 03:00  76      


 


6/5/18 03:00 98.4 63 18 116/69 (85) 95   


 


6/5/18 02:00  54      


 


6/5/18 01:00  52      


 


6/5/18 00:00  52      


 


6/4/18 23:00 97.2 65 18 126/71 (89) 96   


 


6/4/18 23:00  63      


 


6/4/18 22:00  56      


 


6/4/18 21:00  68      


 


6/4/18 20:22 97.9 61 18 126/79 (95) 99   


 


6/4/18 20:00  58      


 


6/4/18 19:00  59      


 


6/4/18 18:00  58      


 


6/4/18 17:00  64      


 


6/4/18 16:00  60      


 


6/4/18 15:00  64      


 


6/4/18 15:00 98.1 62 16 128/71 (90) 99   


 


6/4/18 14:00  60      


 


6/4/18 13:00  60      


 


6/4/18 12:00  66      


 


6/4/18 11:00 98.1 64 16 131/83 (99) 96   


 


6/4/18 11:00  93      


 


6/4/18 10:00  60      














I/O      


 


 6/4/18 6/4/18 6/4/18 6/5/18 6/5/18 6/5/18





 07:00 15:00 23:00 07:00 15:00 23:00


 


Intake Total 480 ml  720 ml 720 ml  


 


Output Total 1000 ml   1450 ml  


 


Balance -520 ml  720 ml -730 ml  


 


      


 


Intake Oral 480 ml  720 ml 720 ml  


 


Output Urine Total 1000 ml   1450 ml  


 


# Voids   6   


 


# Bowel Movements 0     








Physical Exam


Alert


No JVD


Chest: clear


CV S1S2 RRR


no edema


AICD site looks good, steristrips removed


Tele: no VT





Assessment and Plan


Problem List:  


(1) Ischemic cardiomyopathy


ICD Codes:  I25.5 - Ischemic cardiomyopathy


(2) CAD (coronary artery disease)


ICD Codes:  I25.10 - Atherosclerotic heart disease of native coronary artery 

without angina pectoris


Status:  Chronic


(3) Ventricular tachycardia


ICD Codes:  I47.2 - Ventricular tachycardia


Plan:  stable now





(4) S/P implantation of automatic cardioverter/defibrillator (AICD)


ICD Codes:  Z95.810 - Presence of automatic (implantable) cardiac defibrillator


(5) Chronic systolic congestive heart failure, NYHA class 3


ICD Codes:  I50.22 - Chronic systolic (congestive) heart failure


Plan:  Entresto initiated. I adjusted dose based on BP





(6) Elevated troponin


ICD Codes:  R74.8 - Abnormal levels of other serum enzymes


Assessment and Plan


OK with me to discharge. Continue amio 400mg bid for 7 days, then lower dose to 

200mg bid.











Thaddeus Lobo MD Jun 5, 2018 09:23

## 2018-06-05 NOTE — HHI.PR
Addendum to Inpatient Note


Addendum Reason:  Additional Documentation


Additional Information


epistaxis episode. Reduce ASA to 81mg daily











Thaddeus Lobo MD Jun 5, 2018 09:29

## 2018-06-06 VITALS — HEART RATE: 52 BPM

## 2018-06-06 VITALS — HEART RATE: 57 BPM

## 2018-06-06 VITALS
SYSTOLIC BLOOD PRESSURE: 119 MMHG | TEMPERATURE: 97.5 F | OXYGEN SATURATION: 98 % | HEART RATE: 89 BPM | RESPIRATION RATE: 18 BRPM | DIASTOLIC BLOOD PRESSURE: 79 MMHG

## 2018-06-06 VITALS — HEART RATE: 54 BPM

## 2018-06-06 VITALS
SYSTOLIC BLOOD PRESSURE: 131 MMHG | HEART RATE: 54 BPM | RESPIRATION RATE: 18 BRPM | OXYGEN SATURATION: 98 % | DIASTOLIC BLOOD PRESSURE: 81 MMHG | TEMPERATURE: 97.6 F

## 2018-06-06 VITALS
HEART RATE: 61 BPM | SYSTOLIC BLOOD PRESSURE: 130 MMHG | TEMPERATURE: 98.6 F | OXYGEN SATURATION: 97 % | RESPIRATION RATE: 16 BRPM | DIASTOLIC BLOOD PRESSURE: 81 MMHG

## 2018-06-06 VITALS — HEART RATE: 58 BPM

## 2018-06-06 VITALS — HEART RATE: 92 BPM

## 2018-06-06 VITALS — HEART RATE: 63 BPM

## 2018-06-06 VITALS — HEART RATE: 70 BPM

## 2018-06-06 VITALS — HEART RATE: 88 BPM

## 2018-06-06 VITALS — HEART RATE: 56 BPM

## 2018-06-06 VITALS — HEART RATE: 112 BPM

## 2018-06-06 VITALS — HEART RATE: 64 BPM

## 2018-06-06 VITALS — HEART RATE: 86 BPM

## 2018-06-06 VITALS — HEART RATE: 60 BPM

## 2018-06-06 VITALS — HEART RATE: 96 BPM

## 2018-06-06 RX ADMIN — METOPROLOL TARTRATE SCH MG: 25 TABLET, FILM COATED ORAL at 00:24

## 2018-06-06 RX ADMIN — METOPROLOL TARTRATE SCH MG: 25 TABLET, FILM COATED ORAL at 05:50

## 2018-06-06 RX ADMIN — SACUBITRIL AND VALSARTAN SCH TAB: 49; 51 TABLET, FILM COATED ORAL at 09:16

## 2018-06-06 RX ADMIN — Medication SCH ML: at 09:18

## 2018-06-06 RX ADMIN — NYSTATIN SCH APPLIC: 100000 CREAM TOPICAL at 09:00

## 2018-06-06 RX ADMIN — ENOXAPARIN SODIUM SCH MG: 40 INJECTION SUBCUTANEOUS at 12:09

## 2018-06-06 RX ADMIN — AMIODARONE HYDROCHLORIDE SCH MG: 200 TABLET ORAL at 09:16

## 2018-06-06 RX ADMIN — METOPROLOL TARTRATE SCH MG: 25 TABLET, FILM COATED ORAL at 12:08

## 2018-06-06 NOTE — HHI.DS
__________________________________________________





Discharge Summary


Admission Date


Jun 2, 2018 at 10:56


Discharge Date:  Jun 6, 2018


Admitting Diagnosis





VTACH , S/P MULTIPLE AICD DISCHARGE





(1) Ventricular tachycardia


ICD Code:  I47.2 - Ventricular tachycardia


Procedures


None


Brief History - From Admission


This is an 84-year-old female with a history of prolapsed vagina and cystitis.  

She presented to the ER after her AICD which was recently placed kept firing 

off multiple times over last night and this morning.  She states that the 

sensation was unbearable.  She feels that she was asymptomatic prior to the 

firings and that frustrated her.  Interrogation of the AICD reveals that she 

was having recurrent episodes of ventricular tachycardia.  Her AICD was placed 

2 weeks ago with Dr. Dickerson and she was discharged a week later to [facility] rehab where she has remained until today.  Her primary cardiologist is 

Dr. Stokes.  Other history includes lupus and recent urinary tract infection.


CBC/BMP:  


6/3/18 0352                                                                    

            6/4/18 0603





Significant Findings





Laboratory Tests








Test


  6/4/18


06:03


 


Random Glucose


  115 MG/DL


()


 


Calcium Level


  8.4 MG/DL


(8.5-10.1)


 


Chloride Level


  111 MEQ/L


()


 


Estimat Glomerular Filtration


Rate 64 ML/MIN


(>89)








Imaging





Last Impressions








Chest X-Ray 6/2/18 0706 Signed





Impressions: 





 CONCLUSION: 





 Negative examination.





  





 








PE at Discharge


GENERAL: Alert, oriented 3, NAD.


SKIN: Warm and dry.


HEAD: Normocephalic.


EYES: No scleral icterus. No injection or drainage. 


NECK: Supple, trachea midline. No JVD or lymphadenopathy.


CARDIOVASCULAR: Regular rate and rhythm without murmurs, gallops, or rubs. 


RESPIRATORY: Breath sounds equal bilaterally. No accessory muscle use.


GASTROINTESTINAL: Abdomen soft, non-tender, nondistended. 


MUSCULOSKELETAL: No cyanosis, or edema. 


BACK: Nontender without obvious deformity. No CVA tenderness.





Pt Condition on Discharge:  Good


Discharge Disposition:  Discharge to SNF


Discharge Time:  <= 30 minutes


Discharge Instructions


DIET: Follow Instructions for:  Heart Healthy Diet


Activities you can perform:  Regular-No Restrictions


Follow up Referrals:  


Cardiology - 2 Weeks with Edin Dickerson MD


PCP Follow-up - 2 Weeks





New Medications:  


Amiodarone (Amiodarone) 200 Mg Tab


200 MG PO BID for Regulate Heart Beat, #60 TAB 11 Refills


START after 400mg BID dosing for 2 weeks.


Amiodarone (Amiodarone) 200 Mg Tab


400 MG PO BIDPC for Heart, #14 TAB


Finish this then Amiodarone 200mg BID.


Metoprolol Tartrate (Metoprolol Tartrate) 25 Mg Tab


25 MG PO Q6HR for Heart, #120 TAB 11 Refills





Nystatin Topical (Nystatin Topical) 100,000 unit/gm Cream


1 APPLIC TOPICAL Q12HR for Infection, #1 TUBE





Sacubitril-Valsartan (Entresto) 49-51 Mg Tab


1 TAB PO BID for Heart, #30 TAB 11 Refills





 


Continued Medications:  


Aspirin DR (Aspirin EC) 325 Mg Tabdr


325 MG PO DAILY for arrhy for 30 Days, #30 TAB





Hydrocodone/Acetaminophen (Hydrocodone-Acetamin 5-325 mg) 5 Mg-325 Mg Tablet


1 TAB PO Q6HR PRN for PAIN SCALE 4 TO 10, #20 TAB (This prescription has been 

renewed)





Pravastatin (Pravachol) 20 Mg Tab


20 MG PO HS for HLP for 30 Days, TAB





 


Discontinued Medications:  


Enalapril (Enalapril) 10 Mg Tab


10 MG PO BID for HTN for 30 Days, #60 TAB





Levofloxacin (Levaquin) 500 Mg Tablet


500 MG PO DAILY for Infection, TAB 0 Refills





Metoprolol Succinate ER 24 HR (Metoprolol Succinate ER 24 HR) 50 Mg Tab


50 MG PO DAILY for ARRHY for 30 Days, #30 TAB

















Carito Ribeiro DO Jun 6, 2018 23:36

## 2019-03-21 ENCOUNTER — APPOINTMENT (RX ONLY)
Dept: URBAN - METROPOLITAN AREA CLINIC 52 | Facility: CLINIC | Age: 84
Setting detail: DERMATOLOGY
End: 2019-03-21

## 2019-03-21 DIAGNOSIS — L72.0 EPIDERMAL CYST: ICD-10-CM

## 2019-03-21 DIAGNOSIS — L81.4 OTHER MELANIN HYPERPIGMENTATION: ICD-10-CM

## 2019-03-21 DIAGNOSIS — L0390 CELLULITIS AND ABSCESS OF UNSPECIFIED SITES: ICD-10-CM

## 2019-03-21 DIAGNOSIS — L0391 CELLULITIS AND ABSCESS OF UNSPECIFIED SITES: ICD-10-CM

## 2019-03-21 DIAGNOSIS — D22 MELANOCYTIC NEVI: ICD-10-CM

## 2019-03-21 DIAGNOSIS — L57.0 ACTINIC KERATOSIS: ICD-10-CM

## 2019-03-21 DIAGNOSIS — D18.0 HEMANGIOMA: ICD-10-CM

## 2019-03-21 DIAGNOSIS — L82.1 OTHER SEBORRHEIC KERATOSIS: ICD-10-CM

## 2019-03-21 PROBLEM — E78.5 HYPERLIPIDEMIA, UNSPECIFIED: Status: ACTIVE | Noted: 2019-03-21

## 2019-03-21 PROBLEM — I10 ESSENTIAL (PRIMARY) HYPERTENSION: Status: ACTIVE | Noted: 2019-03-21

## 2019-03-21 PROBLEM — D22.9 MELANOCYTIC NEVI, UNSPECIFIED: Status: ACTIVE | Noted: 2019-03-21

## 2019-03-21 PROBLEM — L03.818 CELLULITIS OF OTHER SITES: Status: ACTIVE | Noted: 2019-03-21

## 2019-03-21 PROBLEM — D18.01 HEMANGIOMA OF SKIN AND SUBCUTANEOUS TISSUE: Status: ACTIVE | Noted: 2019-03-21

## 2019-03-21 PROBLEM — M32.10 SYSTEMIC LUPUS ERYTHEMATOSUS, ORGAN OR SYSTEM INVOLVEMENT UNSPECIFIED: Status: ACTIVE | Noted: 2019-03-21

## 2019-03-21 PROCEDURE — 17003 DESTRUCT PREMALG LES 2-14: CPT

## 2019-03-21 PROCEDURE — ? INVENTORY

## 2019-03-21 PROCEDURE — ? PRESCRIPTION

## 2019-03-21 PROCEDURE — ? COUNSELING

## 2019-03-21 PROCEDURE — 99202 OFFICE O/P NEW SF 15 MIN: CPT | Mod: 25

## 2019-03-21 PROCEDURE — 17000 DESTRUCT PREMALG LESION: CPT

## 2019-03-21 PROCEDURE — ? LIQUID NITROGEN

## 2019-03-21 RX ORDER — CEPHALEXIN 500 MG/1
1 CAPSULE ORAL BID
Qty: 60 | Refills: 0 | Status: ERX | COMMUNITY
Start: 2019-03-21

## 2019-03-21 RX ORDER — DOXYCYCLINE HYCLATE 100 MG/1
CAPSULE, GELATIN COATED ORAL QHS
Qty: 30 | Refills: 0 | Status: CANCELLED
Stop reason: ALTCHOICE

## 2019-03-21 RX ADMIN — CEPHALEXIN 1: 500 CAPSULE ORAL at 20:41

## 2019-03-21 ASSESSMENT — LOCATION ZONE DERM
LOCATION ZONE: FACE
LOCATION ZONE: NECK

## 2019-03-21 ASSESSMENT — LOCATION SIMPLE DESCRIPTION DERM
LOCATION SIMPLE: POSTERIOR NECK
LOCATION SIMPLE: LEFT CHEEK
LOCATION SIMPLE: RIGHT EYEBROW

## 2019-03-21 ASSESSMENT — LOCATION DETAILED DESCRIPTION DERM
LOCATION DETAILED: RIGHT CENTRAL EYEBROW
LOCATION DETAILED: LEFT INFERIOR MEDIAL MALAR CHEEK
LOCATION DETAILED: LEFT INFERIOR POSTERIOR NECK

## 2019-04-18 ENCOUNTER — APPOINTMENT (RX ONLY)
Dept: URBAN - METROPOLITAN AREA CLINIC 52 | Facility: CLINIC | Age: 84
Setting detail: DERMATOLOGY
End: 2019-04-18

## 2019-04-18 DIAGNOSIS — L57.0 ACTINIC KERATOSIS: ICD-10-CM

## 2019-04-18 DIAGNOSIS — L0391 CELLULITIS AND ABSCESS OF UNSPECIFIED SITES: ICD-10-CM | Status: RESOLVED

## 2019-04-18 DIAGNOSIS — L0390 CELLULITIS AND ABSCESS OF UNSPECIFIED SITES: ICD-10-CM | Status: RESOLVED

## 2019-04-18 PROBLEM — L03.012 CELLULITIS OF LEFT FINGER: Status: ACTIVE | Noted: 2019-04-18

## 2019-04-18 PROCEDURE — ? LIQUID NITROGEN

## 2019-04-18 PROCEDURE — ? COUNSELING

## 2019-04-18 PROCEDURE — 17000 DESTRUCT PREMALG LESION: CPT

## 2019-04-18 PROCEDURE — 99212 OFFICE O/P EST SF 10 MIN: CPT | Mod: 25

## 2019-04-18 ASSESSMENT — LOCATION ZONE DERM
LOCATION ZONE: NOSE
LOCATION ZONE: FINGER

## 2019-04-18 ASSESSMENT — LOCATION DETAILED DESCRIPTION DERM
LOCATION DETAILED: NASAL ROOT
LOCATION DETAILED: LEFT PROXIMAL DORSAL INDEX FINGER

## 2019-04-18 ASSESSMENT — LOCATION SIMPLE DESCRIPTION DERM
LOCATION SIMPLE: NOSE
LOCATION SIMPLE: LEFT INDEX FINGER